# Patient Record
Sex: FEMALE | Race: WHITE | Employment: UNEMPLOYED | URBAN - NONMETROPOLITAN AREA
[De-identification: names, ages, dates, MRNs, and addresses within clinical notes are randomized per-mention and may not be internally consistent; named-entity substitution may affect disease eponyms.]

---
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
## 2024-01-08 ENCOUNTER — HOSPITAL ENCOUNTER (OUTPATIENT)
Age: 19
Setting detail: SPECIMEN
Discharge: HOME | End: 2024-01-08
Payer: COMMERCIAL

## 2024-01-08 ENCOUNTER — ROUTINE PRENATAL (OUTPATIENT)
Dept: OBGYN | Age: 19
End: 2024-01-08

## 2024-01-08 VITALS — SYSTOLIC BLOOD PRESSURE: 120 MMHG | DIASTOLIC BLOOD PRESSURE: 82 MMHG | BODY MASS INDEX: 40.68 KG/M2 | WEIGHT: 237 LBS

## 2024-01-08 DIAGNOSIS — Z3A.36 36 WEEKS GESTATION OF PREGNANCY: ICD-10-CM

## 2024-01-08 DIAGNOSIS — Z34.03 ENCOUNTER FOR SUPERVISION OF NORMAL FIRST PREGNANCY IN THIRD TRIMESTER: Primary | ICD-10-CM

## 2024-01-08 PROCEDURE — 87081 CULTURE SCREEN ONLY: CPT

## 2024-01-08 PROCEDURE — 0502F SUBSEQUENT PRENATAL CARE: CPT | Performed by: ADVANCED PRACTICE MIDWIFE

## 2024-01-08 NOTE — PROGRESS NOTES
Mireya Cuadra is here at 36w2d for:    Chief Complaint   Patient presents with    Routine Prenatal Visit     Discuss possible induction. Poor appetite.        Estimated Due Date: Estimated Date of Delivery: 2/3/24    OB History    Para Term  AB Living   1             SAB IAB Ectopic Molar Multiple Live Births                    # Outcome Date GA Lbr Mundo/2nd Weight Sex Delivery Anes PTL Lv   1 Current                 Past Medical History:   Diagnosis Date    Chlamydia     STD (sexually transmitted disease)        No past surgical history on file.    Social History     Tobacco Use   Smoking Status Never   Smokeless Tobacco Never        Social History     Substance and Sexual Activity   Alcohol Use Not Currently       No results found for this visit on 24.        HPI: here for routine ob visit and gbs, requests 39 week IOL for support person availability     PT denies fever, chills, nausea and vomiting       Vitals:  Estimated body mass index is 40.68 kg/m² as calculated from the following:    Height as of 23: 1.626 m (5' 4\").    Weight as of this encounter: 107.5 kg (237 lb).  BP: 120/82  Weight - Scale: 107.5 kg (237 lb)  Patient Position: Sitting  Albumin: Trace  Glucose: Negative  Fundal Height (cm): 40 cm  Fetal HR: 153  Movement: Present  Presentation: Vertex  Dilation (cm): Fingertip           Abdomen: gravid,soft, nontender    Results reviewed today:    No results found for this visit on 24.        ASSESSMENT & Plan    Diagnosis Orders   1. Encounter for supervision of normal first pregnancy in third trimester        2. 36 weeks gestation of pregnancy  Culture, Strep B Screen, Vaginal/Rectal                I am having Mireya Cuadra maintain her vitamin B-6, Prenatal MV-Min-Fe Fum-FA-DHA (PRENATAL 1 PO), and omeprazole.    Return in about 1 week (around 1/15/2024) for ob and in 2 weeks add sono for EFW.    There are no Patient Instructions on file for this visit.

## 2024-01-11 LAB
MICROORGANISM SPEC CULT: NORMAL
SPECIMEN DESCRIPTION: NORMAL

## 2024-01-17 ENCOUNTER — ROUTINE PRENATAL (OUTPATIENT)
Dept: OBGYN | Age: 19
End: 2024-01-17

## 2024-01-17 ENCOUNTER — HOSPITAL ENCOUNTER (OUTPATIENT)
Age: 19
Discharge: HOME | End: 2024-01-17
Payer: COMMERCIAL

## 2024-01-17 VITALS — DIASTOLIC BLOOD PRESSURE: 70 MMHG | SYSTOLIC BLOOD PRESSURE: 116 MMHG | BODY MASS INDEX: 41.37 KG/M2 | WEIGHT: 241 LBS

## 2024-01-17 DIAGNOSIS — N76.0 BACTERIAL VAGINOSIS: ICD-10-CM

## 2024-01-17 DIAGNOSIS — Z3A.37 37 WEEKS GESTATION OF PREGNANCY: ICD-10-CM

## 2024-01-17 DIAGNOSIS — J02.9 PHARYNGITIS, UNSPECIFIED ETIOLOGY: ICD-10-CM

## 2024-01-17 DIAGNOSIS — B96.89 BACTERIAL VAGINOSIS: ICD-10-CM

## 2024-01-17 DIAGNOSIS — Z34.03 ENCOUNTER FOR SUPERVISION OF NORMAL FIRST PREGNANCY IN THIRD TRIMESTER: Primary | ICD-10-CM

## 2024-01-17 DIAGNOSIS — J02.9 SORE THROAT: ICD-10-CM

## 2024-01-17 LAB
FLUAV AG SPEC QL: NEGATIVE
FLUBV AG SPEC QL: NEGATIVE

## 2024-01-17 PROCEDURE — 87804 INFLUENZA ASSAY W/OPTIC: CPT

## 2024-01-17 PROCEDURE — 87081 CULTURE SCREEN ONLY: CPT

## 2024-01-17 PROCEDURE — 0502F SUBSEQUENT PRENATAL CARE: CPT | Performed by: ADVANCED PRACTICE MIDWIFE

## 2024-01-17 PROCEDURE — 87635 SARS-COV-2 COVID-19 AMP PRB: CPT

## 2024-01-17 RX ORDER — METRONIDAZOLE 250 MG/1
250 TABLET ORAL 3 TIMES DAILY
Qty: 21 TABLET | Refills: 0 | Status: SHIPPED | OUTPATIENT
Start: 2024-01-17 | End: 2024-01-24

## 2024-01-17 NOTE — PROGRESS NOTES
Mireya Cuadra is here at 37w4d for:    Chief Complaint   Patient presents with    Routine Prenatal Visit     C/O nasal congestion and sore throat for 3 days. Not sleeping well. Patient was in [place] ED 2024 and diagnosed with BV, patient was given Metrogel however has not started as creams \"don't work\"       Estimated Due Date: Estimated Date of Delivery: 2/3/24    OB History    Para Term  AB Living   1             SAB IAB Ectopic Molar Multiple Live Births                    # Outcome Date GA Lbr Mundo/2nd Weight Sex Delivery Anes PTL Lv   1 Current                 Past Medical History:   Diagnosis Date    Chlamydia     STD (sexually transmitted disease)        No past surgical history on file.    Social History     Tobacco Use   Smoking Status Never   Smokeless Tobacco Never        Social History     Substance and Sexual Activity   Alcohol Use Not Currently               HPI: here for routine ob visit, is \"sick\" with cold, went to ED  for rib cage pain and when she reported increased vaginal discharge they did vaginal/ pelvic work up.  Started feeling sore throat and runny nose that night when she got home     PT denies fever, chills, nausea and vomiting       Vitals:  Estimated body mass index is 41.37 kg/m² as calculated from the following:    Height as of 23: 1.626 m (5' 4\").    Weight as of this encounter: 109.3 kg (241 lb).  BP: 116/70  Weight - Scale: 109.3 kg (241 lb)  Patient Position: Sitting  Albumin: Negative  Glucose: Negative  Fundal Height (cm): 37 cm  Fetal HR: 136  Movement: Present  Presentation: Vertex  Dilation (cm): Fingertip  Station: -2           Abdomen: gravid,sof,t nontender    Results reviewed today:    No results found for this visit on 24.        ASSESSMENT & Plan    Diagnosis Orders   1. Encounter for supervision of normal first pregnancy in third trimester        2. Sore throat  Culture, Throat      3. Pharyngitis, unspecified etiology  Rapid

## 2024-01-18 LAB
SARS-COV-2 RNA RESP QL NAA+PROBE: NORMAL
SARS-COV-2 RNA RESP QL NAA+PROBE: NOT DETECTED
SOURCE: NORMAL

## 2024-01-19 LAB
MICROORGANISM SPEC CULT: NORMAL
SPECIMEN DESCRIPTION: NORMAL

## 2024-01-22 ENCOUNTER — ROUTINE PRENATAL (OUTPATIENT)
Dept: OBGYN | Age: 19
End: 2024-01-22

## 2024-01-22 VITALS — WEIGHT: 245 LBS | DIASTOLIC BLOOD PRESSURE: 80 MMHG | SYSTOLIC BLOOD PRESSURE: 122 MMHG | BODY MASS INDEX: 42.05 KG/M2

## 2024-01-22 DIAGNOSIS — Z34.03 ENCOUNTER FOR SUPERVISION OF NORMAL FIRST PREGNANCY IN THIRD TRIMESTER: Primary | ICD-10-CM

## 2024-01-22 DIAGNOSIS — Z3A.38 38 WEEKS GESTATION OF PREGNANCY: ICD-10-CM

## 2024-01-22 PROCEDURE — 0502F SUBSEQUENT PRENATAL CARE: CPT | Performed by: ADVANCED PRACTICE MIDWIFE

## 2024-01-22 NOTE — PROGRESS NOTES
Mireya Cuadra is here at 38w2d for:    Chief Complaint   Patient presents with    Routine Prenatal Visit     Follow up in house ultrasound. C/O cough for 1-1/2 weeks. Patient was at urgent care and was given prescription for Amoxil  however not advised to take it  until consult with Antonia Jamison.        Estimated Due Date: Estimated Date of Delivery: 2/3/24    OB History    Para Term  AB Living   1             SAB IAB Ectopic Molar Multiple Live Births                    # Outcome Date GA Lbr Mundo/2nd Weight Sex Delivery Anes PTL Lv   1 Current                 Past Medical History:   Diagnosis Date    Chlamydia     STD (sexually transmitted disease)        No past surgical history on file.    Social History     Tobacco Use   Smoking Status Never   Smokeless Tobacco Never        Social History     Substance and Sexual Activity   Alcohol Use Not Currently               HPI: here for routine ob visit, requests induction at term     PT denies fever, chills, nausea and vomiting       Vitals:  Estimated body mass index is 42.05 kg/m² as calculated from the following:    Height as of 23: 1.626 m (5' 4\").    Weight as of this encounter: 111.1 kg (245 lb).  BP: 122/80  Weight - Scale: 111.1 kg (245 lb)  Patient Position: Sitting  Albumin: Negative  Glucose: Negative           Abdomen: gravid,sof,t nontender    Results reviewed today:  Sono:  38.4 WK IUP  EFW:76% (7lb 15oz)  CL:unable to measure, due to fetal head position   DENILSON:15.9cm  HR:126bpm  posterior placenta, cephalic presentation  Active fetal movements   No results found for this visit on 24.        ASSESSMENT & Plan    Diagnosis Orders   1. Encounter for supervision of normal first pregnancy in third trimester        2. 38 weeks gestation of pregnancy                  I am having Mireya Cuadra maintain her vitamin B-6, Prenatal MV-Min-Fe Fum-FA-DHA (PRENATAL 1 PO), omeprazole, and metroNIDAZOLE.    Return in about 1 week (around

## 2024-01-28 ENCOUNTER — HOSPITAL ENCOUNTER (INPATIENT)
Age: 19
LOS: 3 days | Discharge: HOME | End: 2024-01-31
Attending: ADVANCED PRACTICE MIDWIFE | Admitting: ADVANCED PRACTICE MIDWIFE
Payer: COMMERCIAL

## 2024-01-28 PROBLEM — Z37.9 NORMAL LABOR: Status: ACTIVE | Noted: 2024-01-28

## 2024-01-28 LAB
ABO + RH BLD: NORMAL
ARM BAND NUMBER: NORMAL
BASOPHILS # BLD: <0.03 K/UL (ref 0–0.2)
BASOPHILS NFR BLD: 0 % (ref 0–2)
BLOOD BANK SAMPLE EXPIRATION: NORMAL
BLOOD GROUP ANTIBODIES SERPL: NEGATIVE
EOSINOPHIL # BLD: 0.05 K/UL (ref 0–0.44)
EOSINOPHILS RELATIVE PERCENT: 1 % (ref 1–4)
ERYTHROCYTE [DISTWIDTH] IN BLOOD BY AUTOMATED COUNT: 14 % (ref 11.8–14.4)
HCT VFR BLD AUTO: 35.6 % (ref 36.3–47.1)
HGB BLD-MCNC: 11.1 G/DL (ref 11.9–15.1)
IMM GRANULOCYTES # BLD AUTO: 0.05 K/UL (ref 0–0.3)
IMM GRANULOCYTES NFR BLD: 1 %
LYMPHOCYTES NFR BLD: 1.7 K/UL (ref 1.2–5.2)
LYMPHOCYTES RELATIVE PERCENT: 20 % (ref 25–45)
MCH RBC QN AUTO: 26.6 PG (ref 25.2–33.5)
MCHC RBC AUTO-ENTMCNC: 31.2 G/DL (ref 28.4–34.8)
MCV RBC AUTO: 85.2 FL (ref 82.6–102.9)
MONOCYTES NFR BLD: 0.53 K/UL (ref 0.1–1.4)
MONOCYTES NFR BLD: 6 % (ref 2–8)
NEUTROPHILS NFR BLD: 72 % (ref 34–64)
NEUTS SEG NFR BLD: 6.13 K/UL (ref 1.8–8)
NRBC BLD-RTO: 0 PER 100 WBC
PLATELET # BLD AUTO: ABNORMAL K/UL (ref 138–453)
PLATELET, FLUORESCENCE: 208 K/UL (ref 138–453)
PLATELETS.RETICULATED NFR BLD AUTO: 16.8 % (ref 1.1–10.3)
RBC # BLD AUTO: 4.18 M/UL (ref 3.95–5.11)
WBC OTHER # BLD: 8.5 K/UL (ref 4.5–13.5)

## 2024-01-28 PROCEDURE — 85025 COMPLETE CBC W/AUTO DIFF WBC: CPT

## 2024-01-28 PROCEDURE — 86900 BLOOD TYPING SEROLOGIC ABO: CPT

## 2024-01-28 PROCEDURE — 1220000000 HC SEMI PRIVATE OB R&B

## 2024-01-28 PROCEDURE — 36415 COLL VENOUS BLD VENIPUNCTURE: CPT

## 2024-01-28 PROCEDURE — 86850 RBC ANTIBODY SCREEN: CPT

## 2024-01-28 PROCEDURE — 59200 INSERT CERVICAL DILATOR: CPT

## 2024-01-28 PROCEDURE — 86901 BLOOD TYPING SEROLOGIC RH(D): CPT

## 2024-01-28 PROCEDURE — 6370000000 HC RX 637 (ALT 250 FOR IP): Performed by: ADVANCED PRACTICE MIDWIFE

## 2024-01-28 RX ORDER — SODIUM CHLORIDE, SODIUM LACTATE, POTASSIUM CHLORIDE, AND CALCIUM CHLORIDE .6; .31; .03; .02 G/100ML; G/100ML; G/100ML; G/100ML
1000 INJECTION, SOLUTION INTRAVENOUS PRN
Status: DISCONTINUED | OUTPATIENT
Start: 2024-01-28 | End: 2024-01-29

## 2024-01-28 RX ORDER — LIDOCAINE HYDROCHLORIDE 10 MG/ML
30 INJECTION, SOLUTION EPIDURAL; INFILTRATION; INTRACAUDAL; PERINEURAL PRN
Status: DISCONTINUED | OUTPATIENT
Start: 2024-01-28 | End: 2024-01-29

## 2024-01-28 RX ORDER — METHYLERGONOVINE MALEATE 0.2 MG/ML
200 INJECTION INTRAVENOUS PRN
Status: DISCONTINUED | OUTPATIENT
Start: 2024-01-28 | End: 2024-01-29

## 2024-01-28 RX ORDER — NALBUPHINE HYDROCHLORIDE 10 MG/ML
10 INJECTION, SOLUTION INTRAMUSCULAR; INTRAVENOUS; SUBCUTANEOUS
Status: DISCONTINUED | OUTPATIENT
Start: 2024-01-28 | End: 2024-01-29

## 2024-01-28 RX ORDER — SODIUM CHLORIDE, SODIUM LACTATE, POTASSIUM CHLORIDE, CALCIUM CHLORIDE 600; 310; 30; 20 MG/100ML; MG/100ML; MG/100ML; MG/100ML
INJECTION, SOLUTION INTRAVENOUS CONTINUOUS
Status: DISCONTINUED | OUTPATIENT
Start: 2024-01-28 | End: 2024-01-29

## 2024-01-28 RX ORDER — SODIUM CHLORIDE 0.9 % (FLUSH) 0.9 %
5-40 SYRINGE (ML) INJECTION EVERY 12 HOURS SCHEDULED
Status: DISCONTINUED | OUTPATIENT
Start: 2024-01-28 | End: 2024-01-29

## 2024-01-28 RX ORDER — MISOPROSTOL 100 UG/1
900 TABLET ORAL PRN
Status: DISCONTINUED | OUTPATIENT
Start: 2024-01-28 | End: 2024-01-31 | Stop reason: HOSPADM

## 2024-01-28 RX ORDER — SEVOFLURANE 250 ML/250ML
1 LIQUID RESPIRATORY (INHALATION) CONTINUOUS PRN
Status: DISCONTINUED | OUTPATIENT
Start: 2024-01-28 | End: 2024-01-29

## 2024-01-28 RX ORDER — SODIUM CHLORIDE 9 MG/ML
INJECTION, SOLUTION INTRAVENOUS PRN
Status: DISCONTINUED | OUTPATIENT
Start: 2024-01-28 | End: 2024-01-29

## 2024-01-28 RX ORDER — SODIUM CHLORIDE, SODIUM LACTATE, POTASSIUM CHLORIDE, AND CALCIUM CHLORIDE .6; .31; .03; .02 G/100ML; G/100ML; G/100ML; G/100ML
500 INJECTION, SOLUTION INTRAVENOUS PRN
Status: DISCONTINUED | OUTPATIENT
Start: 2024-01-28 | End: 2024-01-29

## 2024-01-28 RX ORDER — SODIUM CHLORIDE 0.9 % (FLUSH) 0.9 %
5-40 SYRINGE (ML) INJECTION PRN
Status: DISCONTINUED | OUTPATIENT
Start: 2024-01-28 | End: 2024-01-29

## 2024-01-28 RX ORDER — CARBOPROST TROMETHAMINE 250 UG/ML
250 INJECTION, SOLUTION INTRAMUSCULAR PRN
Status: DISCONTINUED | OUTPATIENT
Start: 2024-01-28 | End: 2024-01-29

## 2024-01-28 RX ORDER — ACETAMINOPHEN 325 MG/1
650 TABLET ORAL EVERY 4 HOURS PRN
Status: DISCONTINUED | OUTPATIENT
Start: 2024-01-28 | End: 2024-01-29

## 2024-01-28 RX ADMIN — Medication 25 MCG: at 19:36

## 2024-01-28 RX ADMIN — Medication 25 MCG: at 23:22

## 2024-01-29 ENCOUNTER — ANESTHESIA EVENT (OUTPATIENT)
Dept: LABOR AND DELIVERY | Age: 19
End: 2024-01-29
Payer: COMMERCIAL

## 2024-01-29 ENCOUNTER — ANESTHESIA (OUTPATIENT)
Dept: LABOR AND DELIVERY | Age: 19
End: 2024-01-29
Payer: COMMERCIAL

## 2024-01-29 PROCEDURE — 59200 INSERT CERVICAL DILATOR: CPT

## 2024-01-29 PROCEDURE — 6360000002 HC RX W HCPCS: Performed by: NURSE ANESTHETIST, CERTIFIED REGISTERED

## 2024-01-29 PROCEDURE — 3E0P7VZ INTRODUCTION OF HORMONE INTO FEMALE REPRODUCTIVE, VIA NATURAL OR ARTIFICIAL OPENING: ICD-10-PCS | Performed by: ADVANCED PRACTICE MIDWIFE

## 2024-01-29 PROCEDURE — 2580000003 HC RX 258: Performed by: ADVANCED PRACTICE MIDWIFE

## 2024-01-29 PROCEDURE — 7200000001 HC VAGINAL DELIVERY

## 2024-01-29 PROCEDURE — 6370000000 HC RX 637 (ALT 250 FOR IP): Performed by: ADVANCED PRACTICE MIDWIFE

## 2024-01-29 PROCEDURE — 88307 TISSUE EXAM BY PATHOLOGIST: CPT

## 2024-01-29 PROCEDURE — 2500000003 HC RX 250 WO HCPCS: Performed by: NURSE ANESTHETIST, CERTIFIED REGISTERED

## 2024-01-29 PROCEDURE — 6360000002 HC RX W HCPCS: Performed by: ADVANCED PRACTICE MIDWIFE

## 2024-01-29 PROCEDURE — 59400 OBSTETRICAL CARE: CPT | Performed by: ADVANCED PRACTICE MIDWIFE

## 2024-01-29 PROCEDURE — 0UQMXZZ REPAIR VULVA, EXTERNAL APPROACH: ICD-10-PCS | Performed by: ADVANCED PRACTICE MIDWIFE

## 2024-01-29 PROCEDURE — 10907ZC DRAINAGE OF AMNIOTIC FLUID, THERAPEUTIC FROM PRODUCTS OF CONCEPTION, VIA NATURAL OR ARTIFICIAL OPENING: ICD-10-PCS | Performed by: ADVANCED PRACTICE MIDWIFE

## 2024-01-29 PROCEDURE — 3700000025 EPIDURAL BLOCK: Performed by: NURSE ANESTHETIST, CERTIFIED REGISTERED

## 2024-01-29 PROCEDURE — 1220000000 HC SEMI PRIVATE OB R&B

## 2024-01-29 PROCEDURE — 3E033VJ INTRODUCTION OF OTHER HORMONE INTO PERIPHERAL VEIN, PERCUTANEOUS APPROACH: ICD-10-PCS | Performed by: ADVANCED PRACTICE MIDWIFE

## 2024-01-29 RX ORDER — MISOPROSTOL 100 UG/1
800 TABLET ORAL PRN
Status: DISCONTINUED | OUTPATIENT
Start: 2024-01-29 | End: 2024-01-31 | Stop reason: HOSPADM

## 2024-01-29 RX ORDER — EPHEDRINE SULFATE/0.9% NACL/PF 25 MG/5 ML
5 SYRINGE (ML) INTRAVENOUS EVERY 5 MIN PRN
Status: DISCONTINUED | OUTPATIENT
Start: 2024-01-29 | End: 2024-01-29

## 2024-01-29 RX ORDER — ACETAMINOPHEN 500 MG
1000 TABLET ORAL EVERY 8 HOURS PRN
Status: DISCONTINUED | OUTPATIENT
Start: 2024-01-29 | End: 2024-01-31 | Stop reason: HOSPADM

## 2024-01-29 RX ORDER — LIDOCAINE HYDROCHLORIDE 20 MG/ML
INJECTION, SOLUTION EPIDURAL; INFILTRATION; INTRACAUDAL; PERINEURAL PRN
Status: DISCONTINUED | OUTPATIENT
Start: 2024-01-29 | End: 2024-01-29 | Stop reason: SDUPTHER

## 2024-01-29 RX ORDER — SODIUM CHLORIDE 9 MG/ML
INJECTION, SOLUTION INTRAVENOUS PRN
Status: DISCONTINUED | OUTPATIENT
Start: 2024-01-29 | End: 2024-01-31 | Stop reason: HOSPADM

## 2024-01-29 RX ORDER — SODIUM CHLORIDE 0.9 % (FLUSH) 0.9 %
5-40 SYRINGE (ML) INJECTION PRN
Status: DISCONTINUED | OUTPATIENT
Start: 2024-01-29 | End: 2024-01-31 | Stop reason: HOSPADM

## 2024-01-29 RX ORDER — CARBOPROST TROMETHAMINE 250 UG/ML
250 INJECTION, SOLUTION INTRAMUSCULAR PRN
Status: DISCONTINUED | OUTPATIENT
Start: 2024-01-29 | End: 2024-01-31 | Stop reason: HOSPADM

## 2024-01-29 RX ORDER — IBUPROFEN 800 MG/1
800 TABLET ORAL EVERY 8 HOURS PRN
Status: DISCONTINUED | OUTPATIENT
Start: 2024-01-29 | End: 2024-01-31 | Stop reason: HOSPADM

## 2024-01-29 RX ORDER — DOCUSATE SODIUM 100 MG/1
100 CAPSULE, LIQUID FILLED ORAL 2 TIMES DAILY PRN
Status: DISCONTINUED | OUTPATIENT
Start: 2024-01-29 | End: 2024-01-31 | Stop reason: HOSPADM

## 2024-01-29 RX ORDER — MODIFIED LANOLIN
OINTMENT (GRAM) TOPICAL PRN
Status: DISCONTINUED | OUTPATIENT
Start: 2024-01-29 | End: 2024-01-31 | Stop reason: HOSPADM

## 2024-01-29 RX ORDER — ROPIVACAINE HYDROCHLORIDE 2 MG/ML
INJECTION, SOLUTION EPIDURAL; INFILTRATION; PERINEURAL CONTINUOUS PRN
Status: DISCONTINUED | OUTPATIENT
Start: 2024-01-29 | End: 2024-01-29 | Stop reason: SDUPTHER

## 2024-01-29 RX ORDER — METHYLERGONOVINE MALEATE 0.2 MG/ML
200 INJECTION INTRAVENOUS PRN
Status: DISCONTINUED | OUTPATIENT
Start: 2024-01-29 | End: 2024-01-31 | Stop reason: HOSPADM

## 2024-01-29 RX ORDER — ONDANSETRON 2 MG/ML
4 INJECTION INTRAMUSCULAR; INTRAVENOUS EVERY 6 HOURS PRN
Status: DISCONTINUED | OUTPATIENT
Start: 2024-01-29 | End: 2024-01-31 | Stop reason: HOSPADM

## 2024-01-29 RX ORDER — MISOPROSTOL 100 UG/1
200 TABLET ORAL PRN
Status: DISCONTINUED | OUTPATIENT
Start: 2024-01-29 | End: 2024-01-31 | Stop reason: HOSPADM

## 2024-01-29 RX ORDER — ROPIVACAINE HYDROCHLORIDE 2 MG/ML
10 INJECTION, SOLUTION EPIDURAL; INFILTRATION; PERINEURAL CONTINUOUS
Status: DISCONTINUED | OUTPATIENT
Start: 2024-01-29 | End: 2024-01-29

## 2024-01-29 RX ORDER — SODIUM CHLORIDE 0.9 % (FLUSH) 0.9 %
5-40 SYRINGE (ML) INJECTION EVERY 12 HOURS SCHEDULED
Status: DISCONTINUED | OUTPATIENT
Start: 2024-01-30 | End: 2024-01-31 | Stop reason: HOSPADM

## 2024-01-29 RX ADMIN — ROPIVACAINE HYDROCHLORIDE 10 ML/HR: 2 INJECTION, SOLUTION EPIDURAL; INFILTRATION at 15:25

## 2024-01-29 RX ADMIN — LIDOCAINE HYDROCHLORIDE 2 ML: 20 INJECTION, SOLUTION EPIDURAL; INFILTRATION; INTRACAUDAL; PERINEURAL at 15:14

## 2024-01-29 RX ADMIN — SODIUM CHLORIDE, SODIUM LACTATE, POTASSIUM CHLORIDE, AND CALCIUM CHLORIDE 500 ML: .6; .31; .03; .02 INJECTION, SOLUTION INTRAVENOUS at 20:44

## 2024-01-29 RX ADMIN — Medication 999 ML/HR: at 22:46

## 2024-01-29 RX ADMIN — SODIUM CHLORIDE, POTASSIUM CHLORIDE, SODIUM LACTATE AND CALCIUM CHLORIDE: 600; 310; 30; 20 INJECTION, SOLUTION INTRAVENOUS at 19:38

## 2024-01-29 RX ADMIN — SODIUM CHLORIDE, POTASSIUM CHLORIDE, SODIUM LACTATE AND CALCIUM CHLORIDE: 600; 310; 30; 20 INJECTION, SOLUTION INTRAVENOUS at 13:52

## 2024-01-29 RX ADMIN — Medication 166 MILLI-UNITS/MIN: at 23:00

## 2024-01-29 RX ADMIN — SODIUM CHLORIDE, POTASSIUM CHLORIDE, SODIUM LACTATE AND CALCIUM CHLORIDE: 600; 310; 30; 20 INJECTION, SOLUTION INTRAVENOUS at 07:34

## 2024-01-29 RX ADMIN — Medication 1 MILLI-UNITS/MIN: at 07:50

## 2024-01-29 RX ADMIN — ROPIVACAINE HYDROCHLORIDE 10 ML/HR: 2 INJECTION, SOLUTION EPIDURAL; INFILTRATION at 21:40

## 2024-01-29 RX ADMIN — ACETAMINOPHEN 650 MG: 325 TABLET ORAL at 21:46

## 2024-01-29 RX ADMIN — METHYLERGONOVINE MALEATE 200 MCG: 0.2 INJECTION INTRAVENOUS at 22:55

## 2024-01-29 RX ADMIN — LIDOCAINE HYDROCHLORIDE 3 ML: 20 INJECTION, SOLUTION EPIDURAL; INFILTRATION; INTRACAUDAL; PERINEURAL at 15:25

## 2024-01-29 RX ADMIN — Medication 25 MCG: at 03:32

## 2024-01-29 ASSESSMENT — PAIN DESCRIPTION - DESCRIPTORS
DESCRIPTORS: CRAMPING
DESCRIPTORS: CRAMPING;PRESSURE
DESCRIPTORS: CRAMPING
DESCRIPTORS: CRAMPING;PRESSURE
DESCRIPTORS: CRAMPING
DESCRIPTORS: PRESSURE
DESCRIPTORS: CRAMPING
DESCRIPTORS: CRAMPING;PRESSURE
DESCRIPTORS: CRAMPING
DESCRIPTORS: CRAMPING

## 2024-01-29 NOTE — FLOWSHEET NOTE
Patients sister made writer aware that patient slipped and fell in the shower on her bottom. Pt denies hitting her abdomen or her head. Pt denies dizziness or any pain. Pt assisted back to bed and was able to ambulate without difficulty. Pt reports positive fetal movement.

## 2024-01-29 NOTE — H&P
Department of Obstetrics and Gynecology   Obstetrics History and Physical  Antonia Jamison [facility]  H&P Admission Inpatient  Note        CHIEF COMPLAINT:  39 week primip in for elective induction at term.  Had cervical ripening last pm with cytotec and pitocin this am    HISTORY OF PRESENT ILLNESS:      The patient is a 18 y.o. female at 39w2d.  OB History          1    Para        Term                AB        Living             SAB        IAB        Ectopic        Molar        Multiple        Live Births                Patient presents with a chief complaint as above and is being admitted for induction    Estimated Due Date: Estimated Date of Delivery: 2/3/24    PRENATAL CARE:    Complicated by: none    PAST OB HISTORY:  OB History          1    Para        Term                AB        Living             SAB        IAB        Ectopic        Molar        Multiple        Live Births                    Past Medical History:        Diagnosis Date    Chlamydia     STD (sexually transmitted disease)      Past Surgical History:    History reviewed. No pertinent surgical history.  Allergies:  Adhesive tape    Social History:    Social History     Socioeconomic History    Marital status: Single     Spouse name: Not on file    Number of children: Not on file    Years of education: Not on file    Highest education level: Not on file   Occupational History    Not on file   Tobacco Use    Smoking status: Never    Smokeless tobacco: Never   Vaping Use    Vaping Use: Former    Substances: Nicotine   Substance and Sexual Activity    Alcohol use: Not Currently    Drug use: Yes     Types: Marijuana (Weed)    Sexual activity: Yes     Partners: Male   Other Topics Concern    Not on file   Social History Narrative    Not on file     Social Determinants of Health     Financial Resource Strain: Not on file   Food Insecurity: Not on file   Transportation Needs: Not on file   Physical Activity: Not on file

## 2024-01-29 NOTE — FLOWSHEET NOTE
Patient playing on cell phone states pain is not as bad when up on birthing ball, just states is tired.  Patient also states IV site is not painful since IV fluids are running,  IV site flushed well

## 2024-01-29 NOTE — FLOWSHEET NOTE
Antonia RAMOSM into room, reviewed fetal strip and contractions.  Talked with patient, voiced that Pitocin may not be able to be increased if contractions stay within 2 minutes.

## 2024-01-29 NOTE — PROGRESS NOTES
Department of Obstetrics and Gynecology   Progress Note      SUBJECTIVE:  got uncomfortable with pitocin, contractions now regular    OBJECTIVE:      Vitals:    01/29/24 1100 01/29/24 1129 01/29/24 1159 01/29/24 1200   BP:  129/75 119/70    Pulse:  83 72    Resp: 18   18   Temp: 98.5 °F (36.9 °C)   98.5 °F (36.9 °C)   TempSrc: Oral   Oral   SpO2:           Fetal heart rate:       Baseline Heart Rate:  145        Accelerations:  present       Long Term Variability:  moderate       Decelerations:  absent         Contraction frequency: 2-4 minutes    Membranes:  artificially ruptured for meconium stained fluid    Cervix:         Dilation:  2 cm         Effacement:  70         Station:  -2         Position:  mid             ASSESSMENT & PLAN:    Continue pitocin induction and pain management

## 2024-01-29 NOTE — FLOWSHEET NOTE
Talked with Dr Byrne informed of elective induction,  weeks of gestation, meconium stained fluid, no medical history, negative GBS, A positive, breast, expecting a girl, okayed for this to enter  orders post delivery

## 2024-01-29 NOTE — FLOWSHEET NOTE
URBAN Jamison CNM made aware of patient fall, FHR minimal variability with one accel, and pitocin hasn't been started. Orders received to begin pitocin per policy if no decels present. TORB. Will endorse to oncoming RNCece.

## 2024-01-29 NOTE — FLOWSHEET NOTE
Antonia Jamison CNM in the OB department, reviewed fetal strip, states had seen in the chart the time of  last SVE, informed Pitocin rate presently orders received

## 2024-01-29 NOTE — FLOWSHEET NOTE
1500:  Marlen FRENCH into room for Epidural  1500:  Inform consent done with this nurse, Marlen FRENCH and patient  1504:  Time out  1507:  Epidural initiated  1510:  Test dose

## 2024-01-29 NOTE — FLOWSHEET NOTE
Antonia Jamison CNM returned call, informed of contraction pattern per palpation patient on side, last SVE of 3cm,  reactive fetal strip, Pitocin rate, patient may have Epidural

## 2024-01-29 NOTE — ANESTHESIA PRE PROCEDURE
Department of Anesthesiology  Preprocedure Note       Name:  Mireya Cuadra   Age:  18 y.o.  :  2005                                          MRN:  [id number]         Date:  2024      Surgeon: * No surgeons listed *    Procedure: * No procedures listed *    Medications prior to admission:   Prior to Admission medications    Medication Sig Start Date End Date Taking? Authorizing Provider   omeprazole (PRILOSEC) 40 MG delayed release capsule Take 1 capsule by mouth every morning (before breakfast) 23   Antonia Jamsion APRN - CNM   vitamin B-6 (PYRIDOXINE) 100 MG tablet Take 1 tablet by mouth daily    ProviderJin MD   Prenatal MV-Min-Fe Fum-FA-DHA (PRENATAL 1 PO) Take by mouth    Provider, MD Jin       Current medications:    Current Facility-Administered Medications   Medication Dose Route Frequency Provider Last Rate Last Admin   • oxytocin (PITOCIN) 30 units in 500 mL infusion  1-24 mika-units/min IntraVENous Continuous Antonia Jamison APRN - CNM 13.5 mL/hr at 24 1356 13.5 mika-units/min at 24 1356   • lactated ringers IV soln infusion   IntraVENous Continuous Antonia Jamison APRN -  mL/hr at 24 1356 Rate Verify at 24 1356   • lactated ringers bolus bolus 500 mL  500 mL IntraVENous PRN Antonia Jamison APRN - CNM        Or   • lactated ringers bolus bolus 1,000 mL  1,000 mL IntraVENous PRN Antonia Jamison APRN - CNM       • sodium chloride flush 0.9 % injection 5-40 mL  5-40 mL IntraVENous 2 times per day Antonia Jamison APRN - CNM       • sodium chloride flush 0.9 % injection 5-40 mL  5-40 mL IntraVENous PRN Antonia Jamison APRN - CNM       • 0.9 % sodium chloride infusion   IntraVENous PRN Antonia Jamison APRN - CNM       • lidocaine PF 1 % injection 30 mL  30 mL Other PRN Antonia Jamison APRN - CNM       • nalbuphine (NUBAIN) injection 10 mg  10 mg IntraVENous Q2H PRN Antonia Jamison

## 2024-01-29 NOTE — ANESTHESIA PROCEDURE NOTES
Epidural Block    Patient location during procedure: OB  Start time: 1/29/2024 3:07 PM  End time: 1/29/2024 3:10 PM  Reason for block: labor epidural  Staffing  Performed: resident/CRNA   Resident/CRNA: Marlen Martinez APRN - CRNA  Performed by: Marlen Martinez APRN - CRNA  Authorized by: Marlen Martinez APRN - CRNA    Epidural  Patient position: sitting  Prep: ChloraPrep  Patient monitoring: continuous pulse ox and frequent blood pressure checks  Approach: midline  Location: L3-4  Injection technique: KE air and KE saline  Provider prep: mask and sterile gloves  Needle  Needle type: Tuohy   Needle gauge: 17 G  Needle length: 3.5 in  Needle insertion depth: 8 cm  Catheter type: multi-orifice  Catheter size: 19 G  Catheter at skin depth: 14 cm  Test dose: negative  Kit: engle  Lot number: [id number]  Expiration date: 11/30/2024Catheter Secured: tegaderm and tape  Assessment  Sensory level: T6  Hemodynamics: stable  Attempts: 1  Outcomes: uncomplicated and patient tolerated procedure well  Preanesthetic Checklist  Completed: patient identified, IV checked, site marked, risks and benefits discussed, surgical/procedural consents, equipment checked, pre-op evaluation, timeout performed, anesthesia consent given, oxygen available, monitors applied/VS acknowledged, fire risk safety assessment completed and verbalized and blood product R/B/A discussed and consented

## 2024-01-29 NOTE — FLOWSHEET NOTE
Antonia Jamison CNM called into the OB department. Informed of Pitocin rate, contraction pattern, orders received

## 2024-01-29 NOTE — FLOWSHEET NOTE
Antonia Jamison [facility] returned call, informed of contraction pattern presently every 2 to 3,  Pitocin decreased to 1.5 do to tachysytole. SVE results, orders received

## 2024-01-30 PROCEDURE — 1220000000 HC SEMI PRIVATE OB R&B

## 2024-01-30 PROCEDURE — 6370000000 HC RX 637 (ALT 250 FOR IP): Performed by: OBSTETRICS & GYNECOLOGY

## 2024-01-30 PROCEDURE — 7200000001 HC VAGINAL DELIVERY

## 2024-01-30 PROCEDURE — 6370000000 HC RX 637 (ALT 250 FOR IP): Performed by: ADVANCED PRACTICE MIDWIFE

## 2024-01-30 PROCEDURE — 99024 POSTOP FOLLOW-UP VISIT: CPT | Performed by: OBSTETRICS & GYNECOLOGY

## 2024-01-30 RX ORDER — GUAIFENESIN 600 MG/1
600 TABLET, EXTENDED RELEASE ORAL 2 TIMES DAILY PRN
Status: DISCONTINUED | OUTPATIENT
Start: 2024-01-30 | End: 2024-01-31 | Stop reason: HOSPADM

## 2024-01-30 RX ADMIN — IBUPROFEN 800 MG: 800 TABLET, FILM COATED ORAL at 00:44

## 2024-01-30 RX ADMIN — GUAIFENESIN 600 MG: 600 TABLET, EXTENDED RELEASE ORAL at 14:55

## 2024-01-30 RX ADMIN — ACETAMINOPHEN 1000 MG: 500 TABLET ORAL at 04:08

## 2024-01-30 RX ADMIN — IBUPROFEN 800 MG: 800 TABLET, FILM COATED ORAL at 21:07

## 2024-01-30 RX ADMIN — IBUPROFEN 800 MG: 800 TABLET, FILM COATED ORAL at 11:45

## 2024-01-30 RX ADMIN — Medication: at 00:16

## 2024-01-30 RX ADMIN — ACETAMINOPHEN 1000 MG: 500 TABLET ORAL at 17:37

## 2024-01-30 RX ADMIN — GUAIFENESIN 600 MG: 600 TABLET, EXTENDED RELEASE ORAL at 21:07

## 2024-01-30 ASSESSMENT — PAIN DESCRIPTION - DESCRIPTORS
DESCRIPTORS: ACHING;SORE
DESCRIPTORS: DISCOMFORT
DESCRIPTORS: ACHING;SORE

## 2024-01-30 ASSESSMENT — PAIN DESCRIPTION - LOCATION
LOCATION: PERINEUM
LOCATION: BACK;VAGINA
LOCATION: BACK;PERINEUM
LOCATION: HEAD
LOCATION: BACK

## 2024-01-30 ASSESSMENT — PAIN DESCRIPTION - ORIENTATION
ORIENTATION: LOWER
ORIENTATION: LOWER

## 2024-01-30 ASSESSMENT — PAIN SCALES - GENERAL
PAINLEVEL_OUTOF10: 4
PAINLEVEL_OUTOF10: 5
PAINLEVEL_OUTOF10: 5

## 2024-01-30 NOTE — LACTATION NOTE
Lactation education:    [x] Latch/ good latch vs shallow latch/ steps to obtaining deep latch    [x] How to know if infant is eating enough/ feedings per 24 hours, wet/dirty diapers    [x] Feeding/satiety cues      Lactation education resources given:     [x]  How to Breastfeed your baby - [facility] publication      [x]  Follow up support information    [x]  Breast milk storage guidelines - [facility]    [x]  Breastpump cleaning guidelines - [facility]     [x]  Breastfeeding & Safe Sleep handout - [facility] publication    [x]  Calling All Dads! Handout - [facility] publication      []  Breast and Nipple Care - [facility]     []  Breastmilk Collection & Storage - [facility]    []  Breastpump Kit Care - [facility]    []  Going Back to Work - [facility]    []  Preventing Engorgement - [facility]    Supplies given:    []  Brush, soap and basin for breastpump cleaning    []  Insurance pump provided     []  [facility] pump set up for patient to use    Explained to patient, patient verbalizes understanding.        Signed:  Gisele Martin RN, BSN, IBCLC

## 2024-01-30 NOTE — FLOWSHEET NOTE
Dr. Carroll called, informed of pt cough.  Orders received.  Lactation consultant consulted on medication d/t breastfeeding.

## 2024-01-30 NOTE — L&D DELIVERY NOTE
Obstetrician    Lesley Reyes, RN Primary Nurse     Primary [place] Nurse     NICU Nurse     Neonatologist     Anesthesiologist     Nurse Anesthetist     Nurse Practitioner     Midwife     Nursery Nurse    Francie Rodriguez               [place] Assessment    Living Status: Living  Delivery Location Comment: 208        Skin Color:   Heart Rate:   Reflex Irritability:   Muscle Tone:   Respiratory Effort:   Total:            1 Minute:    1    2    2    2    2    9         5 Minute:    1    2    2    2    2    9                                        Apgars Assigned By: GORDON REYES RN              Resuscitation    Method: Bulb Suction, Stimulation              Measurements               Skin to Skin      Skin to Skin Initiation Date/Time: 24 22:44:00 EST     Skin to Skin With: Mother                Tight nuchal cord at delivery, somersaulted through and left attached for delayed cord clamping

## 2024-01-30 NOTE — ANESTHESIA POSTPROCEDURE EVALUATION
Department of Anesthesiology  Postprocedure Note    Patient: Mireya Cuadra  MRN: [id number]  YOB: 2005  Date of evaluation: 1/30/2024    Procedure Summary       Date: 01/29/24 Room / Location:     Anesthesia Start: 1500 Anesthesia Stop: 2243    Procedure: Labor Analgesia Diagnosis:     Scheduled Providers:  Responsible Provider: Marlen Martinez APRN - CRNA    Anesthesia Type: epidural ASA Status: 2            Anesthesia Type: No value filed.    Balwinder Phase I:      Balwinder Phase II:      Anesthesia Post Evaluation    Patient location during evaluation: bedside  Patient participation: complete - patient participated  Level of consciousness: awake and alert  Airway patency: patent  Nausea & Vomiting: no nausea and no vomiting  Cardiovascular status: blood pressure returned to baseline and hemodynamically stable  Respiratory status: acceptable and room air  Hydration status: euvolemic  Comments: Patient reports all sensory return to baseline, denies any needs at this time    Pain management: adequate and satisfactory to patient    No notable events documented.

## 2024-01-31 VITALS
TEMPERATURE: 97.6 F | DIASTOLIC BLOOD PRESSURE: 59 MMHG | SYSTOLIC BLOOD PRESSURE: 108 MMHG | RESPIRATION RATE: 16 BRPM | HEART RATE: 63 BPM | OXYGEN SATURATION: 97 %

## 2024-01-31 PROBLEM — Z37.9 NORMAL LABOR: Status: RESOLVED | Noted: 2024-01-28 | Resolved: 2024-01-31

## 2024-01-31 LAB — SURGICAL PATHOLOGY REPORT: NORMAL

## 2024-01-31 PROCEDURE — 99024 POSTOP FOLLOW-UP VISIT: CPT | Performed by: ADVANCED PRACTICE MIDWIFE

## 2024-01-31 PROCEDURE — 6370000000 HC RX 637 (ALT 250 FOR IP): Performed by: ADVANCED PRACTICE MIDWIFE

## 2024-01-31 RX ORDER — OXYMETAZOLINE HYDROCHLORIDE 0.05 G/100ML
2 SPRAY NASAL 2 TIMES DAILY
Status: DISCONTINUED | OUTPATIENT
Start: 2024-01-31 | End: 2024-01-31 | Stop reason: HOSPADM

## 2024-01-31 RX ADMIN — IBUPROFEN 800 MG: 800 TABLET, FILM COATED ORAL at 07:44

## 2024-01-31 RX ADMIN — OXYMETAZOLINE HYDROCHLORIDE 2 SPRAY: 0.05 SPRAY NASAL at 08:30

## 2024-01-31 RX ADMIN — ACETAMINOPHEN 1000 MG: 500 TABLET ORAL at 04:46

## 2024-01-31 ASSESSMENT — PAIN SCALES - GENERAL
PAINLEVEL_OUTOF10: 6
PAINLEVEL_OUTOF10: 5

## 2024-01-31 ASSESSMENT — PAIN DESCRIPTION - LOCATION
LOCATION: ABDOMEN
LOCATION: ABDOMEN

## 2024-01-31 ASSESSMENT — PAIN DESCRIPTION - DESCRIPTORS
DESCRIPTORS: CRAMPING
DESCRIPTORS: CRAMPING

## 2024-01-31 ASSESSMENT — PAIN DESCRIPTION - ORIENTATION: ORIENTATION: LOWER

## 2024-01-31 NOTE — PLAN OF CARE
Problem: Pain  Goal: Verbalizes/displays adequate comfort level or baseline comfort level  2024 1227 by Yakelin Barros RN  Outcome: Progressing  2024 08 by Yakelin Barros RN  Outcome: Progressing     Problem: Vaginal Birth or  Section  Goal: Fetal and maternal status remain reassuring during the birth process  Description:  Birth OB-Pregnancy care plan goal which identifies if the fetal and maternal status remain reassuring during the birth process  2024 1227 by Yakelin Barros RN  Outcome: Progressing  2024 08 by Yakelin Barros RN  Outcome: Progressing     Problem: Postpartum  Goal: Experiences normal postpartum course  Description:  Postpartum OB-Pregnancy care plan goal which identifies if the mother is experiencing a normal postpartum course  2024 1227 by Yakelin Barros RN  Outcome: Progressing  2024 08 by Yakelin Barros RN  Outcome: Progressing  Goal: Appropriate maternal -  bonding  Description:  Postpartum OB-Pregnancy care plan goal which identifies if the mother and  are bonding appropriately  2024 1227 by Yakelin Barros RN  Outcome: Progressing  2024 08 by Yakelin Barros RN  Outcome: Progressing  Goal: Establishment of infant feeding pattern  Description:  Postpartum OB-Pregnancy care plan goal which identifies if the mother is establishing a feeding pattern with their   2024 1227 by Yakelin Barros RN  Outcome: Progressing  2024 08 by Yakelin Barros RN  Outcome: Progressing  Goal: Incisions, wounds, or drain sites healing without S/S of infection  2024 1227 by Yakelin Barros RN  Outcome: Progressing  2024 08 by Yakelin Barros RN  Outcome: Progressing     Problem: Infection - Adult  Goal: Absence of infection at discharge  2024 1227 by Yakelin Barros RN  Outcome: Progressing  2024 08 by Yakelin Barros RN  Outcome: Progressing  Goal: Absence of infection during 
  Problem: Pain  Goal: Verbalizes/displays adequate comfort level or baseline comfort level  2024 by Tamanna Martin RN  Outcome: Progressing  2024 by Yakelin Barros RN  Outcome: Progressing  Flowsheets (Taken 2024 0830)  Verbalizes/displays adequate comfort level or baseline comfort level:   Encourage patient to monitor pain and request assistance   Assess pain using appropriate pain scale   Administer analgesics based on type and severity of pain and evaluate response     Problem: Vaginal Birth or  Section  Goal: Fetal and maternal status remain reassuring during the birth process  Description:  Birth OB-Pregnancy care plan goal which identifies if the fetal and maternal status remain reassuring during the birth process  2024 by Tamanna Martin RN  Outcome: Progressing  2024 by Yakelin Barros RN  Outcome: Progressing     Problem: Postpartum  Goal: Experiences normal postpartum course  Description:  Postpartum OB-Pregnancy care plan goal which identifies if the mother is experiencing a normal postpartum course  2024 by Tamanna Martin RN  Outcome: Progressing  2024 by Yakelin Barros RN  Outcome: Progressing  Goal: Appropriate maternal -  bonding  Description:  Postpartum OB-Pregnancy care plan goal which identifies if the mother and  are bonding appropriately  2024 by Tamanna Martin RN  Outcome: Progressing  2024 by Yakelin Barros RN  Outcome: Progressing  Goal: Establishment of infant feeding pattern  Description:  Postpartum OB-Pregnancy care plan goal which identifies if the mother is establishing a feeding pattern with their   2024 by Tamanna Martin RN  Outcome: Progressing  2024 by Yakelin Barros RN  Outcome: Progressing  Goal: Incisions, wounds, or drain sites healing without S/S of infection  2024 by Tamanna Martin RN  Outcome: 
  Problem: Pain  Goal: Verbalizes/displays adequate comfort level or baseline comfort level  2024 by Yakelin Barros RN  Outcome: Progressing  2024 by Tamanna Martin RN  Outcome: Progressing     Problem: Vaginal Birth or  Section  Goal: Fetal and maternal status remain reassuring during the birth process  Description:  Birth OB-Pregnancy care plan goal which identifies if the fetal and maternal status remain reassuring during the birth process  2024 by Yakelin Barros RN  Outcome: Progressing  2024 by Tamanna Martin RN  Outcome: Progressing     Problem: Postpartum  Goal: Experiences normal postpartum course  Description:  Postpartum OB-Pregnancy care plan goal which identifies if the mother is experiencing a normal postpartum course  2024 by Yakelin Barros RN  Outcome: Progressing  2024 by Tamanna Martin RN  Outcome: Progressing  Goal: Appropriate maternal -  bonding  Description:  Postpartum OB-Pregnancy care plan goal which identifies if the mother and  are bonding appropriately  2024 by Yakelin Barros RN  Outcome: Progressing  2024 by Tamanna Martin RN  Outcome: Progressing  Goal: Establishment of infant feeding pattern  Description:  Postpartum OB-Pregnancy care plan goal which identifies if the mother is establishing a feeding pattern with their   2024 by Yakelin Barros RN  Outcome: Progressing  2024 by Tamanna Martin RN  Outcome: Progressing  Goal: Incisions, wounds, or drain sites healing without S/S of infection  2024 by Yakelin Barros RN  Outcome: Progressing  2024 by Tamanna Martin RN  Outcome: Progressing     Problem: Infection - Adult  Goal: Absence of infection at discharge  2024 by Yakelin Barros RN  Outcome: Progressing  2024 by Tamanna Martin RN  Outcome: Progressing  Goal: Absence of infection 
conditions and co-morbidity symptoms are monitored and maintained or improved  Outcome: Progressing

## 2024-01-31 NOTE — DISCHARGE INSTRUCTIONS
Follow-up with your OB doctor as specified.    [facility] OB Department phone: [phone]    Dr. Benjamin Berman [facility]  Dr. Lucas Jamison [facility]  [address]  [facility] [address]   [place] [phone]   or [place] [phone]       DIET  Eat a well balanced diet focusing on foods high in fiber and protein.   Drink plenty of fluids especially water.  To avoid constipation you may take a mild stool softener as recommended by your doctor or midwife.    ACTIVITY  Gradually increase your activity.  Resume exercise regimen only after advice by your doctor or midwife.  Avoid lifting anything heavier than a gallon of milk for SIX weeks.   Avoid driving until your doctor or midwife has given their approval.  Rise slowly from a lying to sitting and then a standing position.  Climb stairs one at a time.  Use caution when carrying your baby up and down the stairs.  NO SEXUAL Activity for 6 weeks or until advised by your doctor; Nothing in vagina: intercourse, tampons, or douching. No swimming or tub baths x 6 weeks.  Be prepared to discuss family planning at your follow-up OB visit.   You may feel tired or have a lack of energy.  You may continue your prenatal vitamin to replenish nutrients post delivery.  Nap when baby naps to catch up on sleep.     EMOTIONS  You may feel chahal, sad, teary, & overwhelmed.  Contact your OB provider if you feel you may be showing signs of postpartum depression, or have thoughts of harming yourself or your infant.  If infant will not stop crying, contact another adult for help or place infant in their crib on their back and take a break.  NEVER shake your infant.      BLEEDING  Vaginal bleeding will decrease in amount over the next few weeks.  You will notice that as your activity increases, your flow may increase.  This is your body's way of telling you, you need to take things easier and rest more often.  Call your care provider if you are saturating more

## 2024-01-31 NOTE — PROGRESS NOTES
Department of Obstetrics and Gynecology  Labor and Delivery  Attending Post Partum Progress Note      SUBJECTIVE:  Pt doing well; having some perineal pain and using ice; complained of congestion and has used saline spray and we ordered mucinex    OBJECTIVE:      Vitals:  BP 97/63   Pulse 80   Temp 97.5 °F (36.4 °C) (Oral)   Resp 16   LMP 04/29/2023   SpO2 97%   Breastfeeding Unknown     Physical Exam:  General:   Patient without distress.   Abdomen: Soft, fundus firm at level of umbilicus, nontender   Lower Extremities: Negative for swelling, cords, or tenderness.       DATA:    CBC:    Lab Results   Component Value Date/Time    WBC 8.5 01/28/2024 07:35 PM    RBC 4.18 01/28/2024 07:35 PM    HGB 11.1 01/28/2024 07:35 PM    HCT 35.6 01/28/2024 07:35 PM    MCV 85.2 01/28/2024 07:35 PM    RDW 14.0 01/28/2024 07:35 PM    PLT See Reflexed IPF Result 01/28/2024 07:35 PM       ASSESSMENT & PLAN:      Principal Problem:    Normal labor    Normal delivery  Plan: Suppurative care

## 2024-01-31 NOTE — FLOWSHEET NOTE
Pt. Discharged to home, verbalized understanding of instructions, released ambulatory with sister and infant secured in car seat.

## 2024-01-31 NOTE — PROGRESS NOTES
Department of Obstetrics and Gynecology  Labor and Delivery       Post Partum Progress Note             SUBJECTIVE:  2nd day postpartum, s/p , denies c/o but still has \"cold\" and nasal congestion. Using NS spray and mucinex without relief    OBJECTIVE:      Vitals:  BP (!) 108/59   Pulse 63   Temp 97.6 °F (36.4 °C) (Oral)   Resp 16   LMP 2023   SpO2 97%   Breastfeeding Unknown   Patient Vitals for the past 24 hrs:   BP Temp Temp src Pulse Resp SpO2   24 0725 (!) 108/59 97.6 °F (36.4 °C) Oral 63 16 97 %   24 0427 113/76 97.8 °F (36.6 °C) Oral 76 18 97 %   24 2049 (!) 102/56 97.5 °F (36.4 °C) Oral 78 18 98 %   24 1630 97/63 97.5 °F (36.4 °C) Oral 80 16 --   24 1229 104/69 97.8 °F (36.6 °C) Oral 68 16 --         ABDOMEN: normal shape, position and consistency  GENITAL/URINARY:  External Genitalia:  General appearance; normal, Hair distribution; normal, Lesions absent  Uterus:  Size normal, Tenderness absent  Breast:normal appearance, no masses or tenderness  Cor: RRR no Murmurs  Pulmonary: clear to auscultation anterior and posterior  Extremities: no Clubbing cyanosis or ecchymosis    DATA:    CBC:   Lab Results   Component Value Date/Time    WBC 8.5 2024 07:35 PM    RBC 4.18 2024 07:35 PM    HGB 11.1 2024 07:35 PM    HCT 35.6 2024 07:35 PM    MCV 85.2 2024 07:35 PM    MCH 26.6 2024 07:35 PM    MCHC 31.2 2024 07:35 PM    RDW 14.0 2024 07:35 PM    PLT See Reflexed IPF Result 2024 07:35 PM         ASSESSMENT :      Principal Problem:    Normal labor  Plan: delivered  Active Problems:    Normal delivery          Plan:  D/c home, will add afrin no more than 3 days use, routine d/c instructions and rto 6 weeks

## 2024-01-31 NOTE — DISCHARGE SUMMARY
Obstetrical Discharge Form        Gestational Age:39w2d    Antepartum complications: none    Date of Delivery: 24    Type of Delivery:        Delivered By:  URBAN BARBER CNM    Assisted By:N/A}      Baby: female    Anesthesia:  epidural      Intrapartum complications: None    Feeding method: both breast and bottle -     Blood type: A POSITIVE      Rubella:  No results found for: \"RUBG\"  T. Pallidium, IGG:  No results found for: \"TREPG\"  Hepatitis B Surface Antigen: No results found for: \"HEPBSAG\"  HIV:  No results found for: \"HIVAG/AB\"    Results for orders placed or performed during the hospital encounter of 24   CBC auto differential   Result Value Ref Range    WBC 8.5 4.5 - 13.5 k/uL    RBC 4.18 3.95 - 5.11 m/uL    Hemoglobin 11.1 (L) 11.9 - 15.1 g/dL    Hematocrit 35.6 (L) 36.3 - 47.1 %    MCV 85.2 82.6 - 102.9 fL    MCH 26.6 25.2 - 33.5 pg    MCHC 31.2 28.4 - 34.8 g/dL    RDW 14.0 11.8 - 14.4 %    Platelets See Reflexed IPF Result 138 - 453 k/uL    Platelet, Fluorescence 208 138 - 453 k/uL    Platelet, Immature Fraction 16.8 (H) 1.1 - 10.3 %    NRBC Automated 0.0 0.0 per 100 WBC    Neutrophils % 72 (H) 34 - 64 %    Lymphocytes % 20 (L) 25 - 45 %    Monocytes % 6 2 - 8 %    Eosinophils % 1 1 - 4 %    Basophils % 0 0 - 2 %    Immature Granulocytes 1 (H) 0 %    Neutrophils Absolute 6.13 1.80 - 8.00 k/uL    Lymphocytes Absolute 1.70 1.20 - 5.20 k/uL    Monocytes Absolute 0.53 0.10 - 1.40 k/uL    Eosinophils Absolute 0.05 0.00 - 0.44 k/uL    Basophils Absolute <0.03 0.00 - 0.20 k/uL    Absolute Immature Granulocyte 0.05 0.00 - 0.30 k/uL   TYPE AND SCREEN   Result Value Ref Range    Blood Bank Sample Expiration 2024,2359     Arm Band Number [id number]     ABO/Rh A POSITIVE     Antibody Screen NEGATIVE          Postpartum complications: none    Discharge Medication:      Medication List        ASK your doctor about these medications      omeprazole 40 MG delayed release capsule  Commonly known as:

## 2024-02-13 ENCOUNTER — TELEPHONE (OUTPATIENT)
Dept: OBGYN | Age: 19
End: 2024-02-13

## 2024-02-13 NOTE — TELEPHONE ENCOUNTER
Pt informed, will update us in a week if still unbearable  
issues?No  If yes order TSH with reflux to have done in lab within a week

## 2024-03-11 ENCOUNTER — POSTPARTUM VISIT (OUTPATIENT)
Dept: OBGYN | Age: 19
End: 2024-03-11

## 2024-03-11 VITALS
BODY MASS INDEX: 35.85 KG/M2 | SYSTOLIC BLOOD PRESSURE: 116 MMHG | HEIGHT: 64 IN | WEIGHT: 210 LBS | DIASTOLIC BLOOD PRESSURE: 72 MMHG

## 2024-03-11 DIAGNOSIS — N92.6 IRREGULAR MENSES: ICD-10-CM

## 2024-03-11 LAB — HGB, POC: 12.1

## 2024-03-11 PROCEDURE — 0503F POSTPARTUM CARE VISIT: CPT | Performed by: ADVANCED PRACTICE MIDWIFE

## 2024-03-11 RX ORDER — NORELGESTROMIN AND ETHINYL ESTRADIOL 35; 150 UG/MG; UG/MG
1 PATCH TRANSDERMAL WEEKLY
Qty: 3 PATCH | Refills: 6 | Status: SHIPPED | OUTPATIENT
Start: 2024-03-11

## 2024-03-11 NOTE — PROGRESS NOTES
POSTPARTUM EXAM    Date of service: 3/11/2024    Mireya Cuadra  Is a 18 y.o. single female    PT's PCP is: No primary care provider on file.     : 2005     OB History    Para Term  AB Living   1 1 1     1   SAB IAB Ectopic Molar Multiple Live Births           0 1      # Outcome Date GA Lbr Mundo/2nd Weight Sex Delivery Anes PTL Lv   1 Term 24 39w2d 01: 02:07 3.634 kg (8 lb 0.2 oz) F Vag-Spont EPI N BISHOP      Complications: Meconium at birth        Social History     Tobacco Use   Smoking Status Never   Smokeless Tobacco Never         Social History     Substance and Sexual Activity   Alcohol Use Not Currently         Delivery date 2024    Type of delivery:  Spontaneous vaginal delivery    Laceration:Yes, First degree laceration.      Infant gender: female    Infant name: Ann    Are you breast or bottle feeding?  Bottle    Have you been sexually active since delivery? No    Vital Signs: Blood pressure 116/72, height 1.626 m (5' 4\"), weight 95.3 kg (210 lb), last menstrual period 2023, not currently breastfeeding.     Labs:    Blood Type and Rh: A POSITIVE          Allergies: Adhesive tape    No current outpatient medications on file.    Last Yearly date:  never    Last pap date and results: never    Last HPV date and results: never    Chief Complaint   Patient presents with    Postpartum Care     Postpartum exam. Patient had vaginal delivery 2024. Patient has never had pap. Baby is formula fed. Discuss cycle control.         How many Hours of sleep do you get a night: 8    Do you have a normal appetite: poor    Any problems or pain: no    Do you feel like you coping well: good    Is baby sleeping:good    How is baby eating: good    How did first pediatrician visit go: good    EPPDS:       3/11/2024     3:17 PM   Postpartum Depression Scale   I have been able to laugh and see the funny side of things As much as I always could   I have looked forward with

## 2024-03-14 ENCOUNTER — TELEPHONE (OUTPATIENT)
Dept: OBGYN | Age: 19
End: 2024-03-14

## 2024-03-14 DIAGNOSIS — Z11.3 SCREEN FOR STD (SEXUALLY TRANSMITTED DISEASE): Primary | ICD-10-CM

## 2024-03-14 NOTE — TELEPHONE ENCOUNTER
Pt called in and states her previous partner before she gave birth has oral and genital herpes and she has no idea how long this person has had them. Pt has no symptoms but would like labs orders as a precaution.    Can we order these for her?

## 2024-03-15 ENCOUNTER — HOSPITAL ENCOUNTER (OUTPATIENT)
Age: 19
Discharge: HOME | End: 2024-03-15
Payer: COMMERCIAL

## 2024-03-15 DIAGNOSIS — Z11.3 SCREEN FOR STD (SEXUALLY TRANSMITTED DISEASE): ICD-10-CM

## 2024-03-15 PROCEDURE — 36415 COLL VENOUS BLD VENIPUNCTURE: CPT

## 2024-03-15 PROCEDURE — 86695 HERPES SIMPLEX TYPE 1 TEST: CPT

## 2024-03-15 PROCEDURE — 86694 HERPES SIMPLEX NES ANTBDY: CPT

## 2024-03-15 PROCEDURE — 86696 HERPES SIMPLEX TYPE 2 TEST: CPT

## 2024-03-19 LAB
HSV1 IGG SERPL QL IA: 0.25
HSV1+2 IGM SER QL IA: 0.55
HSV2 AB SER QL IA: 0.06

## 2024-04-02 DIAGNOSIS — N92.6 IRREGULAR MENSES: ICD-10-CM

## 2024-04-02 RX ORDER — NORELGESTROMIN AND ETHINYL ESTRADIOL 35; 150 UG/MG; UG/MG
1 PATCH TRANSDERMAL WEEKLY
Qty: 3 PATCH | Refills: 6 | OUTPATIENT
Start: 2024-04-02

## 2024-04-24 ENCOUNTER — TELEPHONE (OUTPATIENT)
Dept: OBGYN | Age: 19
End: 2024-04-24

## 2024-04-24 NOTE — TELEPHONE ENCOUNTER
Patient was in urgent care on 04/12/2024 and tested positive for chlamydia. Patient was given Erythromycin bid for 10 days. Still has 2 days of medication left however has vaginal discharge and irritation and is concerned. Please advise.

## 2024-07-08 ENCOUNTER — TELEPHONE (OUTPATIENT)
Dept: OBGYN | Age: 19
End: 2024-07-08

## 2024-07-08 NOTE — TELEPHONE ENCOUNTER
Patient has appointment 07/17/2024 for STD testing however patient would like blood wor as well, ok  to order.

## 2024-07-09 ENCOUNTER — HOSPITAL ENCOUNTER (OUTPATIENT)
Age: 19
Discharge: HOME | End: 2024-07-09
Payer: COMMERCIAL

## 2024-07-09 DIAGNOSIS — Z20.2 POSSIBLE EXPOSURE TO STD: ICD-10-CM

## 2024-07-09 DIAGNOSIS — Z20.2 POSSIBLE EXPOSURE TO STD: Primary | ICD-10-CM

## 2024-07-09 LAB
HIV 1+2 AB+HIV1 P24 AG SERPL QL IA: NONREACTIVE
T PALLIDUM AB SER QL IA: NONREACTIVE

## 2024-07-09 PROCEDURE — 87350 HEPATITIS BE AG IA: CPT

## 2024-07-09 PROCEDURE — 87340 HEPATITIS B SURFACE AG IA: CPT

## 2024-07-09 PROCEDURE — 86780 TREPONEMA PALLIDUM: CPT

## 2024-07-09 PROCEDURE — 36415 COLL VENOUS BLD VENIPUNCTURE: CPT

## 2024-07-09 PROCEDURE — 86803 HEPATITIS C AB TEST: CPT

## 2024-07-09 PROCEDURE — 87389 HIV-1 AG W/HIV-1&-2 AB AG IA: CPT

## 2024-07-10 ENCOUNTER — TELEPHONE (OUTPATIENT)
Dept: OBGYN | Age: 19
End: 2024-07-10

## 2024-07-10 DIAGNOSIS — Z11.3 SCREEN FOR STD (SEXUALLY TRANSMITTED DISEASE): Primary | ICD-10-CM

## 2024-07-10 LAB
HBV SURFACE AG SERPL QL IA: NONREACTIVE
HCV AB SERPL QL IA: NONREACTIVE

## 2024-07-11 ENCOUNTER — TELEPHONE (OUTPATIENT)
Dept: OBGYN | Age: 19
End: 2024-07-11

## 2024-07-11 NOTE — TELEPHONE ENCOUNTER
Pt states she does have a rash, but its not raised and she isnt feeling ill. Sounds like maybe just a heat rash / irritation. Does not sound like blisters.  Explained the labs.    Told her you would look at rash at appt, and if you felt it needed cultured you would swab it and if it gets worse to call us . She will decide before then if she wants the labs.

## 2024-07-11 NOTE — TELEPHONE ENCOUNTER
Pt called in , in regards to her labs.    She had asked specifically if she could have the HSV 1&2 labs drawn But we did not order those. She would still like them. Is it ok to order those?

## 2024-08-16 DIAGNOSIS — N92.6 IRREGULAR MENSES: ICD-10-CM

## 2024-08-19 RX ORDER — NORELGESTROMIN AND ETHINYL ESTRADIOL 35; 150 UG/D; UG/D
PATCH TRANSDERMAL
Qty: 9 PATCH | Refills: 0 | Status: SHIPPED | OUTPATIENT
Start: 2024-08-19

## 2024-08-21 ENCOUNTER — TELEPHONE (OUTPATIENT)
Dept: OBGYN | Age: 19
End: 2024-08-21

## 2024-08-21 DIAGNOSIS — Z20.2 POSSIBLE EXPOSURE TO STD: Primary | ICD-10-CM

## 2024-09-24 ENCOUNTER — OFFICE VISIT (OUTPATIENT)
Dept: OBGYN | Age: 19
End: 2024-09-24
Payer: COMMERCIAL

## 2024-09-24 ENCOUNTER — HOSPITAL ENCOUNTER (OUTPATIENT)
Age: 19
Discharge: HOME | End: 2024-09-24
Payer: COMMERCIAL

## 2024-09-24 VITALS
DIASTOLIC BLOOD PRESSURE: 72 MMHG | HEIGHT: 64 IN | WEIGHT: 210 LBS | BODY MASS INDEX: 35.85 KG/M2 | SYSTOLIC BLOOD PRESSURE: 106 MMHG

## 2024-09-24 DIAGNOSIS — N92.6 IRREGULAR MENSES: ICD-10-CM

## 2024-09-24 DIAGNOSIS — L73.2 HIDRADENITIS: Primary | ICD-10-CM

## 2024-09-24 DIAGNOSIS — Z20.2 POSSIBLE EXPOSURE TO STD: ICD-10-CM

## 2024-09-24 PROCEDURE — 86696 HERPES SIMPLEX TYPE 2 TEST: CPT

## 2024-09-24 PROCEDURE — 86695 HERPES SIMPLEX TYPE 1 TEST: CPT

## 2024-09-24 PROCEDURE — 99213 OFFICE O/P EST LOW 20 MIN: CPT | Performed by: ADVANCED PRACTICE MIDWIFE

## 2024-09-24 PROCEDURE — G8427 DOCREV CUR MEDS BY ELIG CLIN: HCPCS | Performed by: ADVANCED PRACTICE MIDWIFE

## 2024-09-24 PROCEDURE — 36415 COLL VENOUS BLD VENIPUNCTURE: CPT

## 2024-09-24 PROCEDURE — G8417 CALC BMI ABV UP PARAM F/U: HCPCS | Performed by: ADVANCED PRACTICE MIDWIFE

## 2024-09-24 PROCEDURE — 1036F TOBACCO NON-USER: CPT | Performed by: ADVANCED PRACTICE MIDWIFE

## 2024-09-24 PROCEDURE — 86694 HERPES SIMPLEX NES ANTBDY: CPT

## 2024-09-24 RX ORDER — NORELGESTROMIN AND ETHINYL ESTRADIOL 35; 150 UG/MG; UG/MG
1 PATCH TRANSDERMAL WEEKLY
Qty: 9 PATCH | Refills: 4 | Status: SHIPPED | OUTPATIENT
Start: 2024-09-24

## 2024-09-24 RX ORDER — MINOCYCLINE HYDROCHLORIDE 100 MG/1
100 CAPSULE ORAL 2 TIMES DAILY
Qty: 14 CAPSULE | Refills: 0 | Status: SHIPPED | OUTPATIENT
Start: 2024-09-24 | End: 2024-10-01

## 2024-09-24 ASSESSMENT — PATIENT HEALTH QUESTIONNAIRE - PHQ9
SUM OF ALL RESPONSES TO PHQ9 QUESTIONS 1 & 2: 0
1. LITTLE INTEREST OR PLEASURE IN DOING THINGS: NOT AT ALL
SUM OF ALL RESPONSES TO PHQ QUESTIONS 1-9: 0
2. FEELING DOWN, DEPRESSED OR HOPELESS: NOT AT ALL
SUM OF ALL RESPONSES TO PHQ QUESTIONS 1-9: 0

## 2024-09-26 ENCOUNTER — TELEPHONE (OUTPATIENT)
Dept: OBGYN | Age: 19
End: 2024-09-26

## 2024-09-26 LAB
HSV1 IGG SERPL QL IA: 0.33
HSV1+2 IGM SER QL IA: 0.49
HSV2 AB SER QL IA: 0.07

## 2024-09-26 RX ORDER — SULFAMETHOXAZOLE/TRIMETHOPRIM 800-160 MG
1 TABLET ORAL 2 TIMES DAILY
Qty: 14 TABLET | Refills: 0 | Status: SHIPPED | OUTPATIENT
Start: 2024-09-26 | End: 2024-10-03

## 2024-10-04 ENCOUNTER — TELEPHONE (OUTPATIENT)
Dept: OBGYN | Age: 19
End: 2024-10-04

## 2024-10-04 NOTE — TELEPHONE ENCOUNTER
The patient called this morning, she would like to know if the medication she was taking could be affecting her cycles.  She stated she has been on her cycle for two weeks (after completing the medication) and its not normal for her.     Please advise

## 2024-10-15 NOTE — TELEPHONE ENCOUNTER
Patient calls with complaints of her cycle control patches causing boils.  She had the patch on her chest and got boils in her axilla.  She moved the patch to her lower back and now she has boils between her legs.  She is requesting the her cycle control medication be changed.

## 2024-10-16 RX ORDER — NORGESTIMATE AND ETHINYL ESTRADIOL 0.25-0.035
1 KIT ORAL DAILY
Qty: 3 PACKET | Refills: 1 | Status: SHIPPED | OUTPATIENT
Start: 2024-10-16

## 2024-10-16 NOTE — TELEPHONE ENCOUNTER
Pt would like to switch to the pill, I told her to continue the patch until the pills are sent in, she uses [facility] in [place].

## 2025-01-07 DIAGNOSIS — N91.2 AMENORRHEA: Primary | ICD-10-CM

## 2025-01-14 ENCOUNTER — HOSPITAL ENCOUNTER (OUTPATIENT)
Age: 20
Discharge: HOME | End: 2025-01-14
Payer: COMMERCIAL

## 2025-01-14 DIAGNOSIS — N91.2 AMENORRHEA: ICD-10-CM

## 2025-01-14 LAB — B-HCG SERPL EIA 3RD IS-ACNC: 2879 MIU/ML (ref 0–7)

## 2025-01-14 PROCEDURE — 84702 CHORIONIC GONADOTROPIN TEST: CPT

## 2025-01-14 PROCEDURE — 36415 COLL VENOUS BLD VENIPUNCTURE: CPT

## 2025-02-05 ENCOUNTER — HOSPITAL ENCOUNTER (OUTPATIENT)
Age: 20
Setting detail: SPECIMEN
Discharge: HOME | End: 2025-02-05
Payer: COMMERCIAL

## 2025-02-05 ENCOUNTER — INITIAL PRENATAL (OUTPATIENT)
Dept: OBGYN | Age: 20
End: 2025-02-05
Payer: COMMERCIAL

## 2025-02-05 VITALS — WEIGHT: 225 LBS | SYSTOLIC BLOOD PRESSURE: 106 MMHG | BODY MASS INDEX: 38.62 KG/M2 | DIASTOLIC BLOOD PRESSURE: 70 MMHG

## 2025-02-05 DIAGNOSIS — O99.211 OBESITY AFFECTING PREGNANCY IN FIRST TRIMESTER, UNSPECIFIED OBESITY TYPE: ICD-10-CM

## 2025-02-05 DIAGNOSIS — N91.2 AMENORRHEA: ICD-10-CM

## 2025-02-05 DIAGNOSIS — Z34.81 ENCOUNTER FOR SUPERVISION OF OTHER NORMAL PREGNANCY IN FIRST TRIMESTER: ICD-10-CM

## 2025-02-05 DIAGNOSIS — Z3A.08 8 WEEKS GESTATION OF PREGNANCY: ICD-10-CM

## 2025-02-05 DIAGNOSIS — N91.2 AMENORRHEA: Primary | ICD-10-CM

## 2025-02-05 LAB
ABO + RH BLD: NORMAL
BLOOD GROUP ANTIBODIES SERPL: NEGATIVE
EST. AVERAGE GLUCOSE BLD GHB EST-MCNC: 97 MG/DL
HBA1C MFR BLD: 5 % (ref 4–6)
HCV AB SERPL QL IA: NONREACTIVE
HIV 1+2 AB+HIV1 P24 AG SERPL QL IA: NONREACTIVE

## 2025-02-05 PROCEDURE — 86762 RUBELLA ANTIBODY: CPT

## 2025-02-05 PROCEDURE — G8417 CALC BMI ABV UP PARAM F/U: HCPCS | Performed by: ADVANCED PRACTICE MIDWIFE

## 2025-02-05 PROCEDURE — 87389 HIV-1 AG W/HIV-1&-2 AB AG IA: CPT

## 2025-02-05 PROCEDURE — 86780 TREPONEMA PALLIDUM: CPT

## 2025-02-05 PROCEDURE — G8427 DOCREV CUR MEDS BY ELIG CLIN: HCPCS | Performed by: ADVANCED PRACTICE MIDWIFE

## 2025-02-05 PROCEDURE — 86850 RBC ANTIBODY SCREEN: CPT

## 2025-02-05 PROCEDURE — 87340 HEPATITIS B SURFACE AG IA: CPT

## 2025-02-05 PROCEDURE — 86803 HEPATITIS C AB TEST: CPT

## 2025-02-05 PROCEDURE — 0500F INITIAL PRENATAL CARE VISIT: CPT | Performed by: ADVANCED PRACTICE MIDWIFE

## 2025-02-05 PROCEDURE — 87086 URINE CULTURE/COLONY COUNT: CPT

## 2025-02-05 PROCEDURE — 87491 CHLMYD TRACH DNA AMP PROBE: CPT

## 2025-02-05 PROCEDURE — 86900 BLOOD TYPING SEROLOGIC ABO: CPT

## 2025-02-05 PROCEDURE — 83036 HEMOGLOBIN GLYCOSYLATED A1C: CPT

## 2025-02-05 PROCEDURE — 86901 BLOOD TYPING SEROLOGIC RH(D): CPT

## 2025-02-05 PROCEDURE — 87591 N.GONORRHOEAE DNA AMP PROB: CPT

## 2025-02-05 PROCEDURE — H1000 PRENATAL CARE ATRISK ASSESSM: HCPCS | Performed by: ADVANCED PRACTICE MIDWIFE

## 2025-02-05 PROCEDURE — 85025 COMPLETE CBC W/AUTO DIFF WBC: CPT

## 2025-02-05 RX ORDER — PNV NO.95/FERROUS FUM/FOLIC AC 28MG-0.8MG
1 TABLET ORAL DAILY
Qty: 90 TABLET | Refills: 3 | Status: SHIPPED | OUTPATIENT
Start: 2025-02-05

## 2025-02-05 NOTE — PATIENT INSTRUCTIONS
liquids.  Be sure to drink enough water. And limit sodas, other sweetened drinks, and caffeine.     Choose foods that are good sources of calcium, iron, and folate.  You can try dairy products, dark leafy greens, fortified orange juice and cereals, almonds, broccoli, dried fruit, and beans.     Avoid foods that may be harmful.  Don't eat raw meat, deli meat, raw seafood, or raw eggs. Avoid soft cheese and unpasteurized dairy, like Brie and blue cheese. And don't eat fish that contains a lot of mercury, like shark and swordfish.     Don't touch isaak litter or cat poop.  They can cause an infection that could be harmful during pregnancy.     Avoid things that can make your body too hot.  For example, avoid hot tubs and saunas.     Soothe morning sickness.  Try eating 5 or 6 small meals a day, getting some fresh air, or using margy to control symptoms.     Ask your doctor about flu and COVID-19 shots.  Getting them can help protect against infection.   Follow-up care is a key part of your treatment and safety. Be sure to make and go to all appointments, and call your doctor if you are having problems. It's also a good idea to know your test results and keep a list of the medicines you take.  Where can you learn more?  Go to https://www.[employer]am[employer].net/patientEd and enter G112 to learn more about \"Weeks 6 to 10 of Your Pregnancy: Care Instructions.\"  Current as of: April 30, 2024  Content Version: 14.3  © 2024 [email or web address].   Care instructions adapted under license by [employer]. If you have questions about a medical condition or this instruction, always ask your healthcare professional. [employer], disclaims any warranty or liability for your use of this information.              * Classes are free of charge.       * To register for classes, use the QR code next to each selection. Registration is now online!   * You can also call [phone] for help with registration, or with any questions.

## 2025-02-05 NOTE — PROGRESS NOTES
Your symptoms get worse or you do not get better as expected.   Where can you learn more?  Go to https://www.[employer].net/patientEd and enter W450 to learn more about \"Managing Morning Sickness: Care Instructions.\"  Current as of: April 30, 2024  Content Version: 14.3  © 2024 [employer].   Care instructions adapted under license by [employer]. If you have questions about a medical condition or this instruction, always ask your healthcare professional. [employer], disclaims any warranty or liability for your use of this information.     Patient Education       Nutrition During Pregnancy: Care Instructions  Overview     Healthy eating when you are pregnant is important for you and your baby. It can help you feel well and have a successful pregnancy and delivery. During pregnancy your nutrition needs increase. Even if you have excellent eating habits, your doctor may recommend a multivitamin to make sure you get enough iron and folic acid.  You may wonder how much weight you should gain. In general, if you were at a healthy weight before you became pregnant, then you should gain between 25 and 35 pounds. If you were overweight before pregnancy, then you'll likely be advised to gain 15 to 25 pounds. If you were underweight before pregnancy, then you'll probably be advised to gain 28 to 40 pounds. Your doctor will work with you to set a weight goal that is right for you. Gaining a healthy amount of weight helps you have a healthy baby.  Follow-up care is a key part of your treatment and safety. Be sure to make and go to all appointments, and call your doctor if you are having problems. It's also a good idea to know your test results and keep a list of the medicines you take.  How can you care for yourself at home?  Eat plenty of fruits and vegetables. Include a variety of orange, yellow, and leafy dark-green vegetables every day.  Choose whole-grain bread, cereal, and pasta. Good choices

## 2025-02-06 LAB
BASOPHILS # BLD: 0.03 K/UL (ref 0–0.2)
BASOPHILS NFR BLD: 0 % (ref 0–2)
CHLAMYDIA DNA UR QL NAA+PROBE: NEGATIVE
EOSINOPHIL # BLD: 0.1 K/UL (ref 0–0.44)
EOSINOPHILS RELATIVE PERCENT: 1 % (ref 1–4)
ERYTHROCYTE [DISTWIDTH] IN BLOOD BY AUTOMATED COUNT: 12.6 % (ref 11.8–14.4)
HBV SURFACE AG SERPL QL IA: NONREACTIVE
HCT VFR BLD AUTO: 39.1 % (ref 36.3–47.1)
HGB BLD-MCNC: 13.1 G/DL (ref 11.9–15.1)
IMM GRANULOCYTES # BLD AUTO: 0.03 K/UL (ref 0–0.3)
IMM GRANULOCYTES NFR BLD: 0 %
LYMPHOCYTES NFR BLD: 2.22 K/UL (ref 1.2–5.2)
LYMPHOCYTES RELATIVE PERCENT: 30 % (ref 25–45)
MCH RBC QN AUTO: 28.9 PG (ref 25.2–33.5)
MCHC RBC AUTO-ENTMCNC: 33.5 G/DL (ref 28.4–34.8)
MCV RBC AUTO: 86.3 FL (ref 82.6–102.9)
MICROORGANISM SPEC CULT: NO GROWTH
MONOCYTES NFR BLD: 0.55 K/UL (ref 0.1–1.4)
MONOCYTES NFR BLD: 7 % (ref 2–8)
N GONORRHOEA DNA UR QL NAA+PROBE: NEGATIVE
NEUTROPHILS NFR BLD: 62 % (ref 34–64)
NEUTS SEG NFR BLD: 4.54 K/UL (ref 1.8–8)
NRBC BLD-RTO: 0 PER 100 WBC
PLATELET # BLD AUTO: 247 K/UL (ref 138–453)
PMV BLD AUTO: 12.4 FL (ref 8.1–13.5)
RBC # BLD AUTO: 4.53 M/UL (ref 3.95–5.11)
RUBV IGG SERPL QL IA: 36.9 IU/ML
SERVICE CMNT-IMP: NORMAL
SPECIMEN DESCRIPTION: NORMAL
SPECIMEN DESCRIPTION: NORMAL
T PALLIDUM AB SER QL IA: NONREACTIVE
WBC OTHER # BLD: 7.5 K/UL (ref 4.5–13.5)

## 2025-02-18 ENCOUNTER — ROUTINE PRENATAL (OUTPATIENT)
Dept: OBGYN | Age: 20
End: 2025-02-18
Payer: COMMERCIAL

## 2025-02-18 DIAGNOSIS — Z3A.10 10 WEEKS GESTATION OF PREGNANCY: Primary | ICD-10-CM

## 2025-02-18 PROCEDURE — 36415 COLL VENOUS BLD VENIPUNCTURE: CPT | Performed by: ADVANCED PRACTICE MIDWIFE

## 2025-03-03 ENCOUNTER — TELEPHONE (OUTPATIENT)
Dept: OBGYN | Age: 20
End: 2025-03-03

## 2025-03-03 DIAGNOSIS — Z3A.10 10 WEEKS GESTATION OF PREGNANCY: ICD-10-CM

## 2025-03-03 NOTE — TELEPHONE ENCOUNTER
----- Message from Antonia GORMAN sent at 3/3/2025  7:51 AM EST -----  Low risk and fetal sex if desires

## 2025-03-04 ENCOUNTER — ROUTINE PRENATAL (OUTPATIENT)
Dept: OBGYN | Age: 20
End: 2025-03-04

## 2025-03-04 VITALS
WEIGHT: 226 LBS | DIASTOLIC BLOOD PRESSURE: 68 MMHG | HEART RATE: 102 BPM | BODY MASS INDEX: 38.79 KG/M2 | SYSTOLIC BLOOD PRESSURE: 116 MMHG

## 2025-03-04 DIAGNOSIS — Z34.81 PRENATAL CARE, SUBSEQUENT PREGNANCY, FIRST TRIMESTER: Primary | ICD-10-CM

## 2025-03-04 DIAGNOSIS — Z3A.12 12 WEEKS GESTATION OF PREGNANCY: ICD-10-CM

## 2025-03-04 PROCEDURE — 0501F PRENATAL FLOW SHEET: CPT | Performed by: ADVANCED PRACTICE MIDWIFE

## 2025-03-04 NOTE — PROGRESS NOTES
visit on 03/04/25.        ASSESSMENT & Plan   Assessment & Plan  Prenatal care, subsequent pregnancy, first trimester   Chronic, at goal (stable), continue current treatment plan         12 weeks gestation of pregnancy   Chronic, at goal (stable), continue current treatment plan       reviewe danger signs of axillary abscess          I am having Mireya Cuadra maintain her norelgestromin-ethinyl estradiol, norgestimate-ethinyl estradiol, and Prenatal Vitamins.    Return in about 4 weeks (around 4/1/2025) for ob.    There are no Patient Instructions on file for this visit.             Antonia Jamison, ELISABETH - SALMA,3/4/2025 2:31 PM

## 2025-04-01 ENCOUNTER — ROUTINE PRENATAL (OUTPATIENT)
Dept: OBGYN | Age: 20
End: 2025-04-01

## 2025-04-01 VITALS
DIASTOLIC BLOOD PRESSURE: 72 MMHG | HEART RATE: 62 BPM | WEIGHT: 228 LBS | BODY MASS INDEX: 39.14 KG/M2 | SYSTOLIC BLOOD PRESSURE: 116 MMHG

## 2025-04-01 DIAGNOSIS — Z3A.16 16 WEEKS GESTATION OF PREGNANCY: ICD-10-CM

## 2025-04-01 DIAGNOSIS — Z34.82 PRENATAL CARE, SUBSEQUENT PREGNANCY, SECOND TRIMESTER: Primary | ICD-10-CM

## 2025-04-01 PROCEDURE — 0502F SUBSEQUENT PRENATAL CARE: CPT | Performed by: ADVANCED PRACTICE MIDWIFE

## 2025-04-01 NOTE — PROGRESS NOTES
Mireya Cuadra is here at 16w1d for:    Chief Complaint   Patient presents with    Routine Prenatal Visit     Feeling well.        Estimated Due Date: Estimated Date of Delivery: 9/15/25    OB History    Para Term  AB Living   2 1 1   1   SAB IAB Ectopic Molar Multiple Live Births       0 1      # Outcome Date GA Lbr Mundo/2nd Weight Sex Type Anes PTL Lv   2 Current            1 Term 24 39w2d 01: 02:07 3.634 kg (8 lb 0.2 oz) F Vag-Spont EPI N BISHOP      Complications: Meconium at birth        Past Medical History:   Diagnosis Date    Chlamydia     STD (sexually transmitted disease)        No past surgical history on file.    Social History     Tobacco Use   Smoking Status Former    Current packs/day: 0.00    Types: Cigarettes    Start date: 2020    Quit date: 2025    Years since quittin.2    Passive exposure: Yes   Smokeless Tobacco Never        Social History     Substance and Sexual Activity   Alcohol Use Not Currently               HPI: here for routine ob visit, denies c/o     PT denies fever, chills, nausea and vomiting       Vitals:  Estimated body mass index is 39.14 kg/m² as calculated from the following:    Height as of 24: 1.626 m (5' 4\").    Weight as of this encounter: 103.4 kg (228 lb).  BP: 116/72  Weight - Scale: 103.4 kg (228 lb)  Pulse: 62  Patient Position: Sitting  Albumin: Negative  Glucose: Negative           Abdomen: round, soft, nontender    Results reviewed today:    No results found for this visit on 25.        ASSESSMENT & Plan   Assessment & Plan  Prenatal care, subsequent pregnancy, second trimester   Chronic, at goal (stable), continue current treatment plan         16 weeks gestation of pregnancy   Chronic, at goal (stable), continue current treatment plan                 I am having Mireya Cuadra maintain her norelgestromin-ethinyl estradiol, norgestimate-ethinyl estradiol, and Prenatal Vitamins.    Return in about 4 weeks (around

## 2025-04-29 ENCOUNTER — ROUTINE PRENATAL (OUTPATIENT)
Dept: OBGYN | Age: 20
End: 2025-04-29

## 2025-04-29 VITALS
BODY MASS INDEX: 40.17 KG/M2 | DIASTOLIC BLOOD PRESSURE: 76 MMHG | SYSTOLIC BLOOD PRESSURE: 126 MMHG | HEART RATE: 85 BPM | WEIGHT: 234 LBS

## 2025-04-29 DIAGNOSIS — Z3A.20 20 WEEKS GESTATION OF PREGNANCY: Primary | ICD-10-CM

## 2025-04-29 DIAGNOSIS — Z34.82 PRENATAL CARE, SUBSEQUENT PREGNANCY, SECOND TRIMESTER: ICD-10-CM

## 2025-04-29 PROCEDURE — 0502F SUBSEQUENT PRENATAL CARE: CPT | Performed by: ADVANCED PRACTICE MIDWIFE

## 2025-04-29 NOTE — PROGRESS NOTES
of pregnancy   Chronic, at goal (stable), continue current treatment plan         Prenatal care, subsequent pregnancy, second trimester   Chronic, at goal (stable), continue current treatment plan                 I am having Mireya Cuadra maintain her norelgestromin-ethinyl estradiol, norgestimate-ethinyl estradiol, and Prenatal Vitamins.    No follow-ups on file.    There are no Patient Instructions on file for this visit.             ELISABETH Vernon CNM,4/29/2025 2:26 PM

## 2025-05-07 ENCOUNTER — HOSPITAL ENCOUNTER (EMERGENCY)
Age: 20
Discharge: HOME | End: 2025-05-07
Payer: COMMERCIAL

## 2025-05-07 ENCOUNTER — TELEPHONE (OUTPATIENT)
Dept: OBGYN | Age: 20
End: 2025-05-07

## 2025-05-07 VITALS
BODY MASS INDEX: 37.8 KG/M2 | OXYGEN SATURATION: 97 % | SYSTOLIC BLOOD PRESSURE: 110 MMHG | DIASTOLIC BLOOD PRESSURE: 75 MMHG | TEMPERATURE: 98.1 F | HEART RATE: 90 BPM

## 2025-05-07 DIAGNOSIS — M79.605: ICD-10-CM

## 2025-05-07 DIAGNOSIS — Z3A.22: ICD-10-CM

## 2025-05-07 DIAGNOSIS — O99.891: Primary | ICD-10-CM

## 2025-05-07 PROCEDURE — 93971 EXTREMITY STUDY: CPT

## 2025-05-07 PROCEDURE — 99284 EMERGENCY DEPT VISIT MOD MDM: CPT

## 2025-05-07 NOTE — TELEPHONE ENCOUNTER
OB Patient called stating the varicose vein in her left leg has travelled down from her knee to close to the ankle. It is painful and her leg is numb in the area. She has not noticed any redness though. She has also been wearing her compression socks but per patient it is not helping. Please advise

## 2025-06-04 ENCOUNTER — ROUTINE PRENATAL (OUTPATIENT)
Dept: OBGYN | Age: 20
End: 2025-06-04

## 2025-06-04 VITALS
BODY MASS INDEX: 40.85 KG/M2 | WEIGHT: 238 LBS | SYSTOLIC BLOOD PRESSURE: 122 MMHG | HEART RATE: 98 BPM | DIASTOLIC BLOOD PRESSURE: 82 MMHG

## 2025-06-04 DIAGNOSIS — Z34.82 PRENATAL CARE, SUBSEQUENT PREGNANCY, SECOND TRIMESTER: ICD-10-CM

## 2025-06-04 DIAGNOSIS — L30.9 ECZEMA, UNSPECIFIED TYPE: ICD-10-CM

## 2025-06-04 DIAGNOSIS — Z3A.25 25 WEEKS GESTATION OF PREGNANCY: Primary | ICD-10-CM

## 2025-06-04 PROCEDURE — 0502F SUBSEQUENT PRENATAL CARE: CPT | Performed by: ADVANCED PRACTICE MIDWIFE

## 2025-06-04 RX ORDER — DESOXIMETASONE 2.5 MG/G
CREAM TOPICAL
Qty: 30 G | Refills: 2 | Status: SHIPPED | OUTPATIENT
Start: 2025-06-04

## 2025-06-04 NOTE — PROGRESS NOTES
Mireya Cuadra is here at 25w2d for:    Chief Complaint   Patient presents with    Routine Prenatal Visit     Instructions for 1 hour gtt. At 28 weeks. C/O eczema on ankles.        Estimated Due Date: Estimated Date of Delivery: 9/15/25    OB History    Para Term  AB Living   2 1 1   1   SAB IAB Ectopic Molar Multiple Live Births       0 1      # Outcome Date GA Lbr Mundo/2nd Weight Sex Type Anes PTL Lv   2 Current            1 Term 24 39w2d 01: 02:07 3.634 kg F Vag-Spont EPI N BISHOP      Complications: Meconium at birth        Past Medical History:   Diagnosis Date    Chlamydia     STD (sexually transmitted disease)        No past surgical history on file.    Social History     Tobacco Use   Smoking Status Former    Current packs/day: 0.00    Types: Cigarettes    Start date: 2020    Quit date: 2025    Years since quittin.4    Passive exposure: Yes   Smokeless Tobacco Never        Social History     Substance and Sexual Activity   Alcohol Use Not Currently               HPI: here for routine ob visit and c/o rash on ankles, patient and her mother have eczema \"looks like that\"     PT denies fever, chills, nausea and vomiting       Vitals:  Estimated body mass index is 40.85 kg/m² as calculated from the following:    Height as of 24: 1.626 m (5' 4\").    Weight as of this encounter: 108 kg (238 lb).  BP: 122/82  Weight - Scale: 108 kg (238 lb)  Pulse: 98  Patient Position: Sitting  Albumin: Trace  Glucose: Negative  Movement: Present           Abdomen: round, soft, nonteder  Eczema type patches over front of lower legs approximately 5-8 cm \"patch\" on each side  Results reviewed today:    No results found for this visit on 25.        ASSESSMENT & Plan   Assessment & Plan  Eczema, unspecified type   New, not at goal (unstable), changes made today:      Orders:    desoximetasone (TOPICORT) 0.25 % cream; Apply topically 2 times daily.    25 weeks gestation of pregnancy

## 2025-06-09 ENCOUNTER — TELEPHONE (OUTPATIENT)
Dept: OBGYN | Age: 20
End: 2025-06-09

## 2025-06-09 NOTE — TELEPHONE ENCOUNTER
Patient states that she went to urgent care for another ear infection and they wanted to make sure it was ok to treat her with another round of amoxicillin.

## 2025-06-10 DIAGNOSIS — H66.90 EAR INFECTION: Primary | ICD-10-CM

## 2025-07-01 ENCOUNTER — ROUTINE PRENATAL (OUTPATIENT)
Dept: OBGYN | Age: 20
End: 2025-07-01
Payer: COMMERCIAL

## 2025-07-01 VITALS
WEIGHT: 242 LBS | HEART RATE: 76 BPM | BODY MASS INDEX: 41.54 KG/M2 | SYSTOLIC BLOOD PRESSURE: 122 MMHG | DIASTOLIC BLOOD PRESSURE: 80 MMHG

## 2025-07-01 DIAGNOSIS — Z3A.29 29 WEEKS GESTATION OF PREGNANCY: Primary | ICD-10-CM

## 2025-07-01 DIAGNOSIS — Z34.83 PRENATAL CARE, SUBSEQUENT PREGNANCY, THIRD TRIMESTER: ICD-10-CM

## 2025-07-01 LAB
GLUCOSE 60 MIN, POC: 139
HGB, POC: 11.2 G/DL

## 2025-07-01 PROCEDURE — 85018 HEMOGLOBIN: CPT | Performed by: ADVANCED PRACTICE MIDWIFE

## 2025-07-01 PROCEDURE — 0502F SUBSEQUENT PRENATAL CARE: CPT | Performed by: ADVANCED PRACTICE MIDWIFE

## 2025-07-01 PROCEDURE — 82950 GLUCOSE TEST: CPT | Performed by: ADVANCED PRACTICE MIDWIFE

## 2025-07-01 NOTE — PROGRESS NOTES
Mireya Cuadra is here at 29w1d for:    Chief Complaint   Patient presents with    Routine Prenatal Visit     1 hour gtt.        Estimated Due Date: Estimated Date of Delivery: 9/15/25    OB History    Para Term  AB Living   2 1 1   1   SAB IAB Ectopic Molar Multiple Live Births       0 1      # Outcome Date GA Lbr Mundo/2nd Weight Sex Type Anes PTL Lv   2 Current            1 Term 24 39w2d 01: 02:07 3.634 kg F Vag-Spont EPI N BISHOP      Complications: Meconium at birth        Past Medical History:   Diagnosis Date    Chlamydia     STD (sexually transmitted disease)        No past surgical history on file.    Social History     Tobacco Use   Smoking Status Former    Current packs/day: 0.00    Types: Cigarettes    Start date: 2020    Quit date: 2025    Years since quittin.4    Passive exposure: Yes   Smokeless Tobacco Never        Social History     Substance and Sexual Activity   Alcohol Use Not Currently               HPI: here for routine ob visit, denies c/o     PT denies fever, chills, nausea and vomiting       Vitals:  Estimated body mass index is 41.54 kg/m² as calculated from the following:    Height as of 24: 1.626 m (5' 4\").    Weight as of this encounter: 109.8 kg (242 lb).  BP: 122/80  Weight - Scale: 109.8 kg (242 lb)  Pulse: 76  Patient Position: Sitting  Albumin: Negative  Glucose: Negative  Fetal HR: 134  Movement: Present           Abdomen: round, soft, nontender    Results reviewed today:    No results found for this visit on 25.        ASSESSMENT & Plan   Assessment & Plan  29 weeks gestation of pregnancy   Chronic, at goal (stable), continue current treatment plan    Orders:    POCT hemoglobin    POCT Glucose Tolerance 1 Hr    Prenatal care, subsequent pregnancy, third trimester   Chronic, at goal (stable), continue current treatment plan                 I am having Mireya Cuadra maintain her Prenatal Vitamins and desoximetasone.    Return in

## 2025-07-08 ENCOUNTER — ROUTINE PRENATAL (OUTPATIENT)
Dept: OBGYN | Age: 20
End: 2025-07-08
Payer: COMMERCIAL

## 2025-07-08 VITALS
SYSTOLIC BLOOD PRESSURE: 120 MMHG | DIASTOLIC BLOOD PRESSURE: 76 MMHG | HEART RATE: 97 BPM | BODY MASS INDEX: 41.44 KG/M2 | WEIGHT: 241.4 LBS

## 2025-07-08 DIAGNOSIS — Z23 NEED FOR TDAP VACCINATION: ICD-10-CM

## 2025-07-08 DIAGNOSIS — Z3A.30 30 WEEKS GESTATION OF PREGNANCY: ICD-10-CM

## 2025-07-08 DIAGNOSIS — Z34.83 PRENATAL CARE, SUBSEQUENT PREGNANCY, THIRD TRIMESTER: Primary | ICD-10-CM

## 2025-07-08 PROCEDURE — 90715 TDAP VACCINE 7 YRS/> IM: CPT | Performed by: ADVANCED PRACTICE MIDWIFE

## 2025-07-08 PROCEDURE — 0502F SUBSEQUENT PRENATAL CARE: CPT | Performed by: ADVANCED PRACTICE MIDWIFE

## 2025-07-08 PROCEDURE — 90471 IMMUNIZATION ADMIN: CPT | Performed by: ADVANCED PRACTICE MIDWIFE

## 2025-07-08 NOTE — PROGRESS NOTES
care, subsequent pregnancy, third trimester   Chronic, at goal (stable), continue current treatment plan         30 weeks gestation of pregnancy   Chronic, at goal (stable), continue current treatment plan                 I am having Mireya Cuadra maintain her Prenatal Vitamins and desoximetasone.    No follow-ups on file.    There are no Patient Instructions on file for this visit.      Clinical References           Tdap (Tetanus, Diphtheria, Pertussis) Vaccine: What You Need to Know  Why get vaccinated?  Tdap vaccine can prevent tetanus, diphtheria, and pertussis.  Diphtheria and pertussis spread from person to person. Tetanus enters the body through cuts or wounds.  TETANUS (T) causes painful stiffening of the muscles. Tetanus can lead to serious health problems, including being unable to open the mouth, having trouble swallowing and breathing, or death.  DIPHTHERIA (D) can lead to difficulty breathing, heart failure, paralysis, or death.  PERTUSSIS (aP), also known as \"whooping cough,\" can cause uncontrollable, violent coughing that makes it hard to breathe, eat, or drink. Pertussis can be extremely serious especially in babies and young children, causing pneumonia, convulsions, brain damage, or death. In teens and adults, it can cause weight loss, loss of bladder control, passing out, and rib fractures from severe coughing.  Tdap vaccine  Tdap is only for children 7 years and older, adolescents, and adults.  Adolescents should receive a single dose of Tdap, preferably at age 11 or 12 years.  Pregnant women should get a dose of Tdap during every pregnancy, preferably during the early part of the third trimester, to help protect the  from pertussis. Infants are most at risk for severe, life-threatening complications from pertussis.  Adults who have never received Tdap should get a dose of Tdap.  Also, adults should receive a booster dose of either Tdap or Td (a different vaccine that protects against

## 2025-07-24 DIAGNOSIS — R21 RASH AND OTHER NONSPECIFIC SKIN ERUPTION: Primary | ICD-10-CM

## 2025-07-28 ENCOUNTER — HOSPITAL ENCOUNTER (OUTPATIENT)
Age: 20
Setting detail: SPECIMEN
Discharge: HOME | End: 2025-07-28

## 2025-07-28 ENCOUNTER — HOSPITAL ENCOUNTER (OUTPATIENT)
Age: 20
Discharge: HOME | End: 2025-07-28
Payer: COMMERCIAL

## 2025-07-28 ENCOUNTER — ROUTINE PRENATAL (OUTPATIENT)
Dept: OBGYN | Age: 20
End: 2025-07-28

## 2025-07-28 VITALS
DIASTOLIC BLOOD PRESSURE: 86 MMHG | BODY MASS INDEX: 42.74 KG/M2 | SYSTOLIC BLOOD PRESSURE: 124 MMHG | WEIGHT: 249 LBS | HEART RATE: 79 BPM

## 2025-07-28 DIAGNOSIS — R21 RASH AND OTHER NONSPECIFIC SKIN ERUPTION: ICD-10-CM

## 2025-07-28 DIAGNOSIS — Z34.83 PRENATAL CARE, SUBSEQUENT PREGNANCY, THIRD TRIMESTER: ICD-10-CM

## 2025-07-28 DIAGNOSIS — N76.3 CHRONIC VULVITIS: ICD-10-CM

## 2025-07-28 DIAGNOSIS — Z3A.33 33 WEEKS GESTATION OF PREGNANCY: Primary | ICD-10-CM

## 2025-07-28 LAB
ALBUMIN SERPL-MCNC: 3.7 G/DL (ref 3.5–5.2)
ALBUMIN/GLOB SERPL: 1.4 {RATIO} (ref 1–2.5)
ALP SERPL-CCNC: 106 U/L (ref 35–104)
ALT SERPL-CCNC: 16 U/L (ref 10–35)
ANION GAP SERPL CALCULATED.3IONS-SCNC: 11 MMOL/L (ref 9–16)
AST SERPL-CCNC: 22 U/L (ref 10–35)
BILIRUB SERPL-MCNC: 0.2 MG/DL (ref 0–1.2)
BUN SERPL-MCNC: 7 MG/DL (ref 6–20)
BUN/CREAT SERPL: 12 (ref 9–20)
CALCIUM SERPL-MCNC: 8.3 MG/DL (ref 8.6–10.4)
CHLORIDE SERPL-SCNC: 102 MMOL/L (ref 98–107)
CO2 SERPL-SCNC: 22 MMOL/L (ref 20–31)
CREAT SERPL-MCNC: 0.6 MG/DL (ref 0.5–0.9)
GFR, ESTIMATED: >90 ML/MIN/1.73M2
GLUCOSE SERPL-MCNC: 86 MG/DL (ref 74–99)
HBV SURFACE AG SERPL QL IA: NONREACTIVE
HCV AB SERPL QL IA: NONREACTIVE
HIV 1+2 AB+HIV1 P24 AG SERPL QL IA: NONREACTIVE
POTASSIUM SERPL-SCNC: 3.6 MMOL/L (ref 3.7–5.3)
PROT SERPL-MCNC: 6.3 G/DL (ref 6.6–8.7)
SODIUM SERPL-SCNC: 135 MMOL/L (ref 136–145)
T PALLIDUM AB SER QL IA: NONREACTIVE

## 2025-07-28 PROCEDURE — 82239 BILE ACIDS TOTAL: CPT

## 2025-07-28 PROCEDURE — 36415 COLL VENOUS BLD VENIPUNCTURE: CPT

## 2025-07-28 PROCEDURE — 80053 COMPREHEN METABOLIC PANEL: CPT

## 2025-07-28 PROCEDURE — 87660 TRICHOMONAS VAGIN DIR PROBE: CPT

## 2025-07-28 PROCEDURE — 86695 HERPES SIMPLEX TYPE 1 TEST: CPT

## 2025-07-28 PROCEDURE — 87591 N.GONORRHOEAE DNA AMP PROB: CPT

## 2025-07-28 PROCEDURE — 87480 CANDIDA DNA DIR PROBE: CPT

## 2025-07-28 PROCEDURE — 87340 HEPATITIS B SURFACE AG IA: CPT

## 2025-07-28 PROCEDURE — 87389 HIV-1 AG W/HIV-1&-2 AB AG IA: CPT

## 2025-07-28 PROCEDURE — 86780 TREPONEMA PALLIDUM: CPT

## 2025-07-28 PROCEDURE — 87529 HSV DNA AMP PROBE: CPT

## 2025-07-28 PROCEDURE — 0502F SUBSEQUENT PRENATAL CARE: CPT | Performed by: ADVANCED PRACTICE MIDWIFE

## 2025-07-28 PROCEDURE — 86696 HERPES SIMPLEX TYPE 2 TEST: CPT

## 2025-07-28 PROCEDURE — 87491 CHLMYD TRACH DNA AMP PROBE: CPT

## 2025-07-28 PROCEDURE — 87510 GARDNER VAG DNA DIR PROBE: CPT

## 2025-07-28 PROCEDURE — 86694 HERPES SIMPLEX NES ANTBDY: CPT

## 2025-07-28 PROCEDURE — 86803 HEPATITIS C AB TEST: CPT

## 2025-07-28 RX ORDER — CLOTRIMAZOLE AND BETAMETHASONE DIPROPIONATE 10; .64 MG/G; MG/G
CREAM TOPICAL
Qty: 45 G | Refills: 0 | Status: SHIPPED | OUTPATIENT
Start: 2025-07-28

## 2025-07-28 NOTE — PROGRESS NOTES
Mireya Cuadra is here at 33w0d for:    Chief Complaint   Patient presents with    Routine Prenatal Visit     Feeling ok, .Patient had concerns with rash all over off and on. Had labs drawn today.        Estimated Due Date: Estimated Date of Delivery: 9/15/25    OB History    Para Term  AB Living   2 1 1   1   SAB IAB Ectopic Molar Multiple Live Births       0 1      # Outcome Date GA Lbr Mundo/2nd Weight Sex Type Anes PTL Lv   2 Current            1 Term 24 39w2d 01: 02:07 3.634 kg F Vag-Spont EPI N BISHOP      Complications: Meconium at birth        Past Medical History:   Diagnosis Date    Chlamydia     STD (sexually transmitted disease)        No past surgical history on file.    Social History     Tobacco Use   Smoking Status Former    Current packs/day: 0.00    Types: Cigarettes    Start date: 2020    Quit date: 2025    Years since quittin.5    Passive exposure: Yes   Smokeless Tobacco Never        Social History     Substance and Sexual Activity   Alcohol Use Not Currently               HPI: here for routine ob visit but continues to c/o vulvar irritation.  Is concerned if std she wants it to not effect her delivery.       PT denies fever, chills, nausea and vomiting       Vitals:  Estimated body mass index is 42.74 kg/m² as calculated from the following:    Height as of 24: 1.626 m (5' 4\").    Weight as of this encounter: 112.9 kg (249 lb).  BP: 124/86  Weight - Scale: 112.9 kg (249 lb)  Pulse: 79  Patient Position: Sitting  Fundal Height (cm): 33 cm  Fetal HR: 155  Movement: Present           Abdomen: gravid,soft, nontender  Pelvic exam: VULVA: errythema without open sored, culture taken from in side left l. [place] where most redness is, no obvious discharge externally, VAGINA: vaginal discharge - milky, odorless, and thin, CERVIX: normal appearing cervix without discharge or lesions.      Results reviewed today:    No results found for this visit on

## 2025-07-29 LAB
C TRACH DNA SPEC QL PROBE+SIG AMP: NEGATIVE
CANDIDA SPECIES: NEGATIVE
GARDNERELLA VAGINALIS: NEGATIVE
HSV1 DNA SPEC QL NAA+PROBE: NEGATIVE
HSV1+2 IGM SER QL IA: 0.4
HSV2 DNA SPEC QL NAA+PROBE: NEGATIVE
N GONORRHOEA DNA SPEC QL PROBE+SIG AMP: NEGATIVE
SOURCE: NORMAL
SPECIMEN DESCRIPTION: NORMAL
SPECIMEN DESCRIPTION: NORMAL
TRICHOMONAS: NEGATIVE

## 2025-07-30 LAB — BILE AC SERPL-SCNC: 4 UMOL/L (ref 0–10)

## 2025-07-31 LAB
HSV1 IGG SERPL QL IA: 0.47
HSV2 AB SER QL IA: 0.05

## 2025-08-14 ENCOUNTER — ROUTINE PRENATAL (OUTPATIENT)
Dept: OBGYN | Age: 20
End: 2025-08-14

## 2025-08-14 VITALS — WEIGHT: 247 LBS | BODY MASS INDEX: 42.4 KG/M2 | SYSTOLIC BLOOD PRESSURE: 116 MMHG | DIASTOLIC BLOOD PRESSURE: 74 MMHG

## 2025-08-14 DIAGNOSIS — Z34.83 PRENATAL CARE, SUBSEQUENT PREGNANCY, THIRD TRIMESTER: Primary | ICD-10-CM

## 2025-08-14 DIAGNOSIS — Z3A.35 35 WEEKS GESTATION OF PREGNANCY: ICD-10-CM

## 2025-08-14 PROCEDURE — 0502F SUBSEQUENT PRENATAL CARE: CPT | Performed by: ADVANCED PRACTICE MIDWIFE

## 2025-08-14 SDOH — ECONOMIC STABILITY: FOOD INSECURITY: WITHIN THE PAST 12 MONTHS, YOU WORRIED THAT YOUR FOOD WOULD RUN OUT BEFORE YOU GOT MONEY TO BUY MORE.: NEVER TRUE

## 2025-08-14 SDOH — ECONOMIC STABILITY: FOOD INSECURITY: WITHIN THE PAST 12 MONTHS, THE FOOD YOU BOUGHT JUST DIDN'T LAST AND YOU DIDN'T HAVE MONEY TO GET MORE.: NEVER TRUE

## 2025-08-14 ASSESSMENT — PATIENT HEALTH QUESTIONNAIRE - PHQ9
2. FEELING DOWN, DEPRESSED OR HOPELESS: NOT AT ALL
SUM OF ALL RESPONSES TO PHQ QUESTIONS 1-9: 0
1. LITTLE INTEREST OR PLEASURE IN DOING THINGS: NOT AT ALL
SUM OF ALL RESPONSES TO PHQ QUESTIONS 1-9: 0

## 2025-08-19 ENCOUNTER — HOSPITAL ENCOUNTER (OUTPATIENT)
Age: 20
Setting detail: SPECIMEN
Discharge: HOME | End: 2025-08-19
Payer: COMMERCIAL

## 2025-08-19 ENCOUNTER — ROUTINE PRENATAL (OUTPATIENT)
Dept: OBGYN | Age: 20
End: 2025-08-19

## 2025-08-19 VITALS
BODY MASS INDEX: 42.05 KG/M2 | HEART RATE: 91 BPM | WEIGHT: 245 LBS | SYSTOLIC BLOOD PRESSURE: 118 MMHG | DIASTOLIC BLOOD PRESSURE: 76 MMHG

## 2025-08-19 DIAGNOSIS — Z3A.36 36 WEEKS GESTATION OF PREGNANCY: ICD-10-CM

## 2025-08-19 DIAGNOSIS — Z3A.36 36 WEEKS GESTATION OF PREGNANCY: Primary | ICD-10-CM

## 2025-08-19 DIAGNOSIS — Z34.83 PRENATAL CARE, SUBSEQUENT PREGNANCY, THIRD TRIMESTER: ICD-10-CM

## 2025-08-19 PROCEDURE — 0502F SUBSEQUENT PRENATAL CARE: CPT | Performed by: ADVANCED PRACTICE MIDWIFE

## 2025-08-19 PROCEDURE — 86403 PARTICLE AGGLUT ANTBDY SCRN: CPT

## 2025-08-19 PROCEDURE — 87081 CULTURE SCREEN ONLY: CPT

## 2025-08-21 LAB
MICROORGANISM SPEC CULT: ABNORMAL
SERVICE CMNT-IMP: ABNORMAL
SPECIMEN DESCRIPTION: ABNORMAL

## 2025-08-26 ENCOUNTER — ROUTINE PRENATAL (OUTPATIENT)
Dept: OBGYN | Age: 20
End: 2025-08-26

## 2025-08-26 VITALS
DIASTOLIC BLOOD PRESSURE: 78 MMHG | BODY MASS INDEX: 42.6 KG/M2 | SYSTOLIC BLOOD PRESSURE: 118 MMHG | WEIGHT: 248.2 LBS | HEART RATE: 94 BPM

## 2025-08-26 DIAGNOSIS — Z3A.37 37 WEEKS GESTATION OF PREGNANCY: Primary | ICD-10-CM

## 2025-08-26 DIAGNOSIS — Z34.83 PRENATAL CARE, SUBSEQUENT PREGNANCY, THIRD TRIMESTER: ICD-10-CM

## 2025-08-26 PROCEDURE — 0502F SUBSEQUENT PRENATAL CARE: CPT | Performed by: ADVANCED PRACTICE MIDWIFE

## 2025-09-02 ENCOUNTER — ROUTINE PRENATAL (OUTPATIENT)
Dept: OBGYN | Age: 20
End: 2025-09-02

## 2025-09-02 VITALS
DIASTOLIC BLOOD PRESSURE: 74 MMHG | SYSTOLIC BLOOD PRESSURE: 116 MMHG | HEART RATE: 112 BPM | WEIGHT: 248 LBS | BODY MASS INDEX: 42.57 KG/M2

## 2025-09-02 DIAGNOSIS — Z34.83 PRENATAL CARE, SUBSEQUENT PREGNANCY, THIRD TRIMESTER: Primary | ICD-10-CM

## 2025-09-02 DIAGNOSIS — Z3A.38 38 WEEKS GESTATION OF PREGNANCY: ICD-10-CM

## 2025-09-02 PROCEDURE — 0502F SUBSEQUENT PRENATAL CARE: CPT | Performed by: ADVANCED PRACTICE MIDWIFE
